# Patient Record
Sex: FEMALE | Race: WHITE | NOT HISPANIC OR LATINO | Employment: FULL TIME | RURAL
[De-identification: names, ages, dates, MRNs, and addresses within clinical notes are randomized per-mention and may not be internally consistent; named-entity substitution may affect disease eponyms.]

---

## 2020-01-31 ENCOUNTER — OFFICE VISIT (OUTPATIENT)
Dept: FAMILY MEDICINE CLINIC | Facility: CLINIC | Age: 60
End: 2020-01-31

## 2020-01-31 VITALS
WEIGHT: 189.6 LBS | HEART RATE: 89 BPM | OXYGEN SATURATION: 99 % | TEMPERATURE: 96.3 F | RESPIRATION RATE: 18 BRPM | SYSTOLIC BLOOD PRESSURE: 136 MMHG | BODY MASS INDEX: 42.65 KG/M2 | HEIGHT: 56 IN | DIASTOLIC BLOOD PRESSURE: 80 MMHG

## 2020-01-31 DIAGNOSIS — Z87.891 HISTORY OF TOBACCO USE: ICD-10-CM

## 2020-01-31 DIAGNOSIS — E66.3 OVERWEIGHT: ICD-10-CM

## 2020-01-31 DIAGNOSIS — G44.89 OTHER HEADACHE SYNDROME: ICD-10-CM

## 2020-01-31 DIAGNOSIS — R53.83 OTHER FATIGUE: Primary | ICD-10-CM

## 2020-01-31 PROCEDURE — 99202 OFFICE O/P NEW SF 15 MIN: CPT | Performed by: FAMILY MEDICINE

## 2020-01-31 RX ORDER — VITAMIN E 268 MG
400 CAPSULE ORAL DAILY
COMMUNITY
End: 2023-03-17

## 2020-01-31 RX ORDER — TOPIRAMATE 50 MG/1
TABLET, FILM COATED ORAL
Qty: 90 TABLET | Refills: 3 | Status: SHIPPED | OUTPATIENT
Start: 2020-01-31 | End: 2022-08-12

## 2020-01-31 RX ORDER — PHENOL 1.4 %
600 AEROSOL, SPRAY (ML) MUCOUS MEMBRANE
COMMUNITY
End: 2023-03-17

## 2020-01-31 RX ORDER — ESTRADIOL 0.04 MG/D
FILM, EXTENDED RELEASE TRANSDERMAL
COMMUNITY
Start: 2020-01-02 | End: 2022-08-12

## 2020-01-31 NOTE — PROGRESS NOTES
Subjective   Sariah Arnold is a 59 y.o. female.     Chief Complaint   Patient presents with   • Weight Gain   • Fatigue       Obesity   This is a new problem. The current episode started more than 1 month ago. The problem occurs constantly. The problem has been gradually worsening. Associated symptoms include fatigue. Pertinent negatives include no abdominal pain, chest pain, chills, diaphoresis or nausea.   Fatigue   This is a new problem. The current episode started more than 1 month ago. The problem occurs constantly. The problem has been gradually worsening. Associated symptoms include fatigue. Pertinent negatives include no abdominal pain, chest pain, chills, diaphoresis or nausea. She has tried rest and sleep for the symptoms. The treatment provided no relief.            I personally reviewed and updated the patient's allergies, medications, problem list, and past medical, surgical, social, and family history.     Family History   Problem Relation Age of Onset   • Hypertension Mother    • Cancer Sister         melanoma   • Diabetes Paternal Grandmother    • Diabetes Paternal Grandfather    • Cancer Half-Sister         thyroid cancer       Social History     Tobacco Use   • Smoking status: Former Smoker     Types: Cigarettes, Electronic Cigarette   • Smokeless tobacco: Never Used   Substance Use Topics   • Alcohol use: Yes     Frequency: Monthly or less   • Drug use: Never       Past Surgical History:   Procedure Laterality Date   • HYSTERECTOMY         Patient Active Problem List   Diagnosis   • History of tobacco use   • Other fatigue   • Overweight         Current Outpatient Medications:   •  calcium carbonate (OS-FLORENTIN) 600 MG tablet, Take 600 mg by mouth 3 (Three) Times a Day With Meals., Disp: , Rfl:   •  estradiol (VIVELLE-DOT) 0.0375 MG/24HR, , Disp: , Rfl:   •  vitamin E 400 UNIT capsule, Take 400 Units by mouth Daily., Disp: , Rfl:          Review of Systems   Constitutional: Positive for fatigue.  "Negative for chills and diaphoresis.   Eyes: Negative for visual disturbance.   Respiratory: Negative for shortness of breath.    Cardiovascular: Negative for chest pain and palpitations.   Gastrointestinal: Negative for abdominal pain and nausea.   Endocrine: Negative for polydipsia and polyphagia.   Musculoskeletal: Negative for neck stiffness.   Skin: Negative for color change and pallor.   Neurological: Negative for seizures and syncope.   Hematological: Negative for adenopathy.       Objective   /80   Pulse 89   Temp 96.3 °F (35.7 °C)   Resp 18   Ht 142.2 cm (56\")   Wt 86 kg (189 lb 9.6 oz)   SpO2 99%   Breastfeeding No   BMI 42.51 kg/m²   Wt Readings from Last 3 Encounters:   01/31/20 86 kg (189 lb 9.6 oz)   11/19/18 79.8 kg (176 lb)     Physical Exam   Constitutional: She is oriented to person, place, and time. She appears well-developed and well-nourished.   Cardiovascular: Normal rate, regular rhythm, S1 normal, S2 normal, normal heart sounds, intact distal pulses and normal pulses. Exam reveals no gallop and no friction rub.   No murmur heard.  Pulmonary/Chest: Effort normal and breath sounds normal. No accessory muscle usage or stridor. She has no decreased breath sounds. She has no wheezes. She has no rhonchi. She has no rales.   Abdominal: Soft. Normal appearance, normal aorta and bowel sounds are normal. She exhibits no distension, no pulsatile midline mass and no mass. There is no hepatosplenomegaly. There is no tenderness. There is no rigidity, no rebound, no guarding, no CVA tenderness and negative Balderas's sign. No hernia.   Neurological: She is alert and oriented to person, place, and time. Coordination and gait normal.   Skin: Skin is warm and dry. Turgor is normal. She is not diaphoretic. No pallor.         Assessment/Plan       Fatigue.  Has had blood work, replacing vitamin D, will get records.  Start regular exercise program.  Weight gain.  Following quitting smoking.  Discussed " diet, exercise, lifestyle modification.  History of intolerance to Wellbutrin, start Topamax.  Follow-up recheck.  Shift work disorder.  Has done well on Provigil, consider restart.  Screening for hyperlipidemia.  Has had blood work, will get records, follow-up visit scheduled.  Vis followed by OB/GYN Dr. Shook      Problem List Items Addressed This Visit        Other    History of tobacco use    Other fatigue - Primary    Overweight              Expected course, medications, and adverse effects discussed.  Call or return if worsening or persistent symptoms.

## 2020-05-01 ENCOUNTER — TELEPHONE (OUTPATIENT)
Dept: FAMILY MEDICINE CLINIC | Facility: CLINIC | Age: 60
End: 2020-05-01

## 2020-05-01 ENCOUNTER — TELEMEDICINE (OUTPATIENT)
Dept: FAMILY MEDICINE CLINIC | Facility: CLINIC | Age: 60
End: 2020-05-01

## 2020-05-01 VITALS — WEIGHT: 182.8 LBS | BODY MASS INDEX: 40.98 KG/M2

## 2020-05-01 DIAGNOSIS — E66.3 OVERWEIGHT: ICD-10-CM

## 2020-05-01 DIAGNOSIS — R53.83 OTHER FATIGUE: Primary | ICD-10-CM

## 2020-05-01 DIAGNOSIS — Z87.891 HISTORY OF TOBACCO USE: ICD-10-CM

## 2020-05-01 PROCEDURE — 99213 OFFICE O/P EST LOW 20 MIN: CPT | Performed by: FAMILY MEDICINE

## 2020-05-01 NOTE — PROGRESS NOTES
Subjective   Sariah Arnold is a 59 y.o. female.     No chief complaint on file.      Obesity   This is a new problem. The current episode started more than 1 month ago. The problem occurs constantly. The problem has been gradually worsening. Associated symptoms include fatigue. Pertinent negatives include no abdominal pain, chest pain, chills, diaphoresis or nausea.   Fatigue   This is a new problem. The current episode started more than 1 month ago. The problem occurs constantly. The problem has been gradually worsening. Associated symptoms include fatigue. Pertinent negatives include no abdominal pain, chest pain, chills, diaphoresis or nausea. She has tried rest and sleep for the symptoms. The treatment provided no relief.            I personally reviewed and updated the patient's allergies, medications, problem list, and past medical, surgical, social, and family history.     Family History   Problem Relation Age of Onset   • Hypertension Mother    • Cancer Sister         melanoma   • Diabetes Paternal Grandmother    • Diabetes Paternal Grandfather    • Cancer Half-Sister         thyroid cancer       Social History     Tobacco Use   • Smoking status: Former Smoker     Types: Cigarettes, Electronic Cigarette   • Smokeless tobacco: Never Used   Substance Use Topics   • Alcohol use: Yes     Frequency: Monthly or less   • Drug use: Never       Past Surgical History:   Procedure Laterality Date   • HYSTERECTOMY         Patient Active Problem List   Diagnosis   • History of tobacco use   • Other fatigue   • Overweight   • Other headache syndrome         Current Outpatient Medications:   •  calcium carbonate (OS-FLORENTIN) 600 MG tablet, Take 600 mg by mouth 3 (Three) Times a Day With Meals., Disp: , Rfl:   •  estradiol (VIVELLE-DOT) 0.0375 MG/24HR, , Disp: , Rfl:   •  topiramate (TOPAMAX) 50 MG tablet, Take 1 tablet nightly, Disp: 90 tablet, Rfl: 3  •  vitamin E 400 UNIT capsule, Take 400 Units by mouth Daily., Disp: ,  Rfl:          Review of Systems   Constitutional: Positive for fatigue. Negative for chills and diaphoresis.   Eyes: Negative for visual disturbance.   Respiratory: Negative for shortness of breath.    Cardiovascular: Negative for chest pain and palpitations.   Gastrointestinal: Negative for abdominal pain and nausea.   Endocrine: Negative for polydipsia and polyphagia.   Musculoskeletal: Negative for neck stiffness.   Skin: Negative for color change and pallor.   Neurological: Negative for seizures and syncope.   Hematological: Negative for adenopathy.   Psychiatric/Behavioral: Negative for hallucinations and suicidal ideas.       I have reviewed and confirmed the accuracy of the ROS as documented by the MA/LPN/RN Ever Champagne MD      Objective   Wt 82.9 kg (182 lb 12.8 oz)   BMI 40.98 kg/m²   Wt Readings from Last 3 Encounters:   05/01/20 82.9 kg (182 lb 12.8 oz)   01/31/20 86 kg (189 lb 9.6 oz)   11/19/18 79.8 kg (176 lb)     Physical Exam   Constitutional: She appears well-developed and well-nourished.       Sariah Arnold consented to undergo a video visit today.  This format was necessitated by the current covid-19 virus pandemic.    Assessment/Plan     Fatigue.  Replacing vitamin D, recheck levels.  Start regular exercise program.  Weight gain.  Following quitting smoking.  Discussed diet, exercise, lifestyle modification.  History of intolerance to Wellbutrin, not tolerating Topamax at 50 mg, cut in half.  Follow-up recheck.  Shift work disorder.  Has done well on Provigil, consider restart.  Screening for hyperlipidemia.    Check fasting blood work.  Followed by OB/GYN Dr. Shook.  Follow-up visit for comprehensive physical exam screening test scheduled.      Problem List Items Addressed This Visit        Unprioritized    History of tobacco use    Other fatigue - Primary    Overweight              Expected course, medications, and adverse effects discussed.  Call or return if worsening or persistent  symptoms.

## 2020-05-01 NOTE — TELEPHONE ENCOUNTER
Get her set up for a fasting for panel, free T4, B12, folate and vitamin D sometime in the next 1 to 2 weeks, schedule a physical/follow-up visit in 3 to 4 months, thanks

## 2020-05-26 ENCOUNTER — TELEPHONE (OUTPATIENT)
Dept: FAMILY MEDICINE CLINIC | Facility: CLINIC | Age: 60
End: 2020-05-26

## 2020-05-26 NOTE — TELEPHONE ENCOUNTER
----- Message from Ever Champagne MD sent at 5/18/2020 11:52 AM EDT -----  Let her know her blood work overall looks good, vitamin levels are normal, blood count looks good, cholesterol, blood sugar normal, her thyroid is just mildly low, not low enough to cause her any symptoms, this is likely to resolve on its own, will recheck this in 6 months, thanks

## 2020-06-01 ENCOUNTER — TELEPHONE (OUTPATIENT)
Dept: FAMILY MEDICINE CLINIC | Facility: CLINIC | Age: 60
End: 2020-06-01

## 2020-06-05 NOTE — TELEPHONE ENCOUNTER
We tried to call her several weeks ago and can get a hold of her, okay to use the same message that is in there, thanks

## 2020-08-14 ENCOUNTER — OFFICE VISIT (OUTPATIENT)
Dept: FAMILY MEDICINE CLINIC | Facility: CLINIC | Age: 60
End: 2020-08-14

## 2020-08-14 VITALS
HEART RATE: 96 BPM | WEIGHT: 187.8 LBS | TEMPERATURE: 98.4 F | RESPIRATION RATE: 16 BRPM | SYSTOLIC BLOOD PRESSURE: 128 MMHG | HEIGHT: 66 IN | DIASTOLIC BLOOD PRESSURE: 78 MMHG | OXYGEN SATURATION: 98 % | BODY MASS INDEX: 30.18 KG/M2

## 2020-08-14 DIAGNOSIS — R07.9 CHEST PAIN, UNSPECIFIED TYPE: ICD-10-CM

## 2020-08-14 DIAGNOSIS — Z12.39 ENCOUNTER FOR SCREENING FOR MALIGNANT NEOPLASM OF BREAST: ICD-10-CM

## 2020-08-14 DIAGNOSIS — G47.26 SHIFT WORK SLEEP DISORDER: ICD-10-CM

## 2020-08-14 DIAGNOSIS — Z12.4 SCREENING FOR MALIGNANT NEOPLASM OF CERVIX: ICD-10-CM

## 2020-08-14 DIAGNOSIS — Z87.891 HISTORY OF TOBACCO USE: ICD-10-CM

## 2020-08-14 DIAGNOSIS — E03.9 ACQUIRED HYPOTHYROIDISM: ICD-10-CM

## 2020-08-14 DIAGNOSIS — E66.3 OVERWEIGHT: ICD-10-CM

## 2020-08-14 DIAGNOSIS — Z12.11 SCREENING FOR MALIGNANT NEOPLASM OF COLON: ICD-10-CM

## 2020-08-14 DIAGNOSIS — Z00.01 ANNUAL VISIT FOR GENERAL ADULT MEDICAL EXAMINATION WITH ABNORMAL FINDINGS: Primary | ICD-10-CM

## 2020-08-14 PROBLEM — M19.90 ARTHRITIS: Status: ACTIVE | Noted: 2018-03-28

## 2020-08-14 LAB
BILIRUB BLD-MCNC: NEGATIVE MG/DL
CLARITY, POC: CLEAR
COLOR UR: YELLOW
GLUCOSE UR STRIP-MCNC: NEGATIVE MG/DL
KETONES UR QL: NEGATIVE
LEUKOCYTE EST, POC: NEGATIVE
NITRITE UR-MCNC: NEGATIVE MG/ML
PH UR: 5 [PH] (ref 5–8)
PROT UR STRIP-MCNC: NEGATIVE MG/DL
RBC # UR STRIP: NEGATIVE /UL
SP GR UR: 1.02 (ref 1–1.03)
UROBILINOGEN UR QL: NORMAL

## 2020-08-14 PROCEDURE — 81003 URINALYSIS AUTO W/O SCOPE: CPT | Performed by: FAMILY MEDICINE

## 2020-08-14 PROCEDURE — 99213 OFFICE O/P EST LOW 20 MIN: CPT | Performed by: FAMILY MEDICINE

## 2020-08-14 PROCEDURE — 99396 PREV VISIT EST AGE 40-64: CPT | Performed by: FAMILY MEDICINE

## 2020-08-14 RX ORDER — VALACYCLOVIR HYDROCHLORIDE 1 G/1
TABLET, FILM COATED ORAL
COMMUNITY

## 2020-08-14 RX ORDER — MODAFINIL 100 MG/1
100 TABLET ORAL DAILY
Qty: 30 TABLET | Refills: 2 | Status: SHIPPED | OUTPATIENT
Start: 2020-08-14 | End: 2020-11-13 | Stop reason: SDUPTHER

## 2020-08-14 RX ORDER — POLYETHYLENE GLYCOL 3350, SODIUM CHLORIDE, SODIUM BICARBONATE, POTASSIUM CHLORIDE 420; 11.2; 5.72; 1.48 G/4L; G/4L; G/4L; G/4L
POWDER, FOR SOLUTION ORAL
COMMUNITY
End: 2021-12-03

## 2020-08-14 NOTE — PROGRESS NOTES
Subjective   Sariah Arnold is a 59 y.o. female.     Chief Complaint   Patient presents with   • Annual Exam       The patient is here: to discuss health maintenance and disease prevention.  Last comprehensive physical was on 10/1/2013.  Patient's previous physician was .  Overall has: moderate activity with work/home activities, good appetite, feels well with minor complaints, decreased energy level and is sleeping poorly. Nutrition: balanced diet. Last tetanus shot was 10/22/2009. Patient is doing routine self breast exams: monthly Patient's last PAP Smear was: 3/2020. Patient's last DEXA Scan was: 4/24/2019. The Patient's last Mammogram was: 10/2019.    History of Present Illness     Recent Hospitalizations:  No hospitalization(s) within the last year..  ccc      I personally reviewed and updated the patient's allergies, medications, problem list, and past medical, surgical, social, and family history.     Family History   Problem Relation Age of Onset   • Hypertension Mother    • Cancer Sister         melanoma   • Diabetes Paternal Grandmother    • Diabetes Paternal Grandfather    • Cancer Half-Sister         thyroid cancer       Social History     Tobacco Use   • Smoking status: Former Smoker     Packs/day: 1.00     Years: 35.00     Pack years: 35.00     Types: Cigarettes, Electronic Cigarette   • Smokeless tobacco: Never Used   Substance Use Topics   • Alcohol use: Yes     Frequency: Monthly or less   • Drug use: Never       Past Surgical History:   Procedure Laterality Date   • HYSTERECTOMY         Patient Active Problem List   Diagnosis   • History of tobacco use   • Other fatigue   • Overweight   • Other headache syndrome   • Arthritis   • Annual visit for general adult medical examination with abnormal findings   • Screening for malignant neoplasm of colon   • Screening for malignant neoplasm of cervix   • Encounter for screening for malignant neoplasm of breast   • Shift work sleep disorder  "        Current Outpatient Medications:   •  calcium carbonate (OS-FLORENTIN) 600 MG tablet, Take 600 mg by mouth 3 (Three) Times a Day With Meals., Disp: , Rfl:   •  estradiol (VIVELLE-DOT) 0.0375 MG/24HR, , Disp: , Rfl:   •  vitamin E 400 UNIT capsule, Take 400 Units by mouth Daily., Disp: , Rfl:   •  esterified estrogens (Menest) 0.625 MG tablet, Menest 0.625 mg tablet, Disp: , Rfl:   •  modafinil (Provigil) 100 MG tablet, Take 1 tablet by mouth Daily., Disp: 30 tablet, Rfl: 2  •  polyethylene glycol-electrolytes (NULYTELY) 420 g solution, peg-electrolyte solution 420 gram oral solution, Disp: , Rfl:   •  topiramate (TOPAMAX) 50 MG tablet, Take 1 tablet nightly, Disp: 90 tablet, Rfl: 3  •  valACYclovir (VALTREX) 1000 MG tablet, valacyclovir 1 gram tablet, Disp: , Rfl:          Review of Systems   Constitutional: Negative for chills and diaphoresis.   HENT: Negative for trouble swallowing and voice change.    Eyes: Negative for visual disturbance.   Respiratory: Negative for shortness of breath.    Cardiovascular: Negative for chest pain and palpitations.   Gastrointestinal: Negative for abdominal pain and nausea.   Endocrine: Negative for polydipsia and polyphagia.   Genitourinary: Negative for hematuria.   Musculoskeletal: Negative for neck stiffness.   Skin: Negative for color change and pallor.   Allergic/Immunologic: Negative for immunocompromised state.   Neurological: Negative for seizures and syncope.   Hematological: Negative for adenopathy.   Psychiatric/Behavioral: Negative for sleep disturbance and suicidal ideas.       I have reviewed and confirmed the accuracy of the ROS as documented by the MA/LPN/RN Ever Champagne MD      Objective   /78   Pulse 96   Temp 98.4 °F (36.9 °C)   Resp 16   Ht 167.6 cm (66\")   Wt 85.2 kg (187 lb 12.8 oz)   SpO2 98%   BMI 30.31 kg/m²   BP Readings from Last 3 Encounters:   08/14/20 128/78   01/31/20 136/80   11/19/18 118/82     Wt Readings from Last 3 Encounters: "   08/14/20 85.2 kg (187 lb 12.8 oz)   05/01/20 82.9 kg (182 lb 12.8 oz)   01/31/20 86 kg (189 lb 9.6 oz)     Physical Exam   Constitutional: She is oriented to person, place, and time. She appears well-developed and well-nourished.   HENT:   Head: Normocephalic.   Right Ear: Tympanic membrane, external ear and ear canal normal.   Left Ear: Tympanic membrane, external ear and ear canal normal.   Nose: Nose normal.   Eyes: Pupils are equal, round, and reactive to light. Conjunctivae, EOM and lids are normal.   Neck: No JVD present. Carotid bruit is not present. No tracheal deviation present. No thyromegaly present.   Cardiovascular: Normal rate, regular rhythm, normal heart sounds and intact distal pulses. Exam reveals no gallop and no friction rub.   No murmur heard.  Pulmonary/Chest: Effort normal and breath sounds normal. No stridor. She has no decreased breath sounds. She has no wheezes. She has no rales.   Abdominal: Soft. Bowel sounds are normal. She exhibits no distension and no mass. There is no tenderness. There is no rebound and no guarding. No hernia.   Lymphadenopathy:        Head (right side): No submental, no submandibular, no tonsillar, no preauricular, no posterior auricular and no occipital adenopathy present.        Head (left side): No submental, no submandibular, no tonsillar, no preauricular, no posterior auricular and no occipital adenopathy present.     She has no cervical adenopathy.   Neurological: She is alert and oriented to person, place, and time. She has normal strength and normal reflexes. No cranial nerve deficit or sensory deficit. Coordination and gait normal.   Skin: Skin is warm and dry. Turgor is normal. She is not diaphoretic. No pallor.       Recent Lab Results:    Lab Results   Component Value Date    GLU 96 05/15/2020        Lab Results   Component Value Date    TRIG 119 05/15/2020    HDL 54 05/15/2020    VLDL 24 05/15/2020     LDL Cholesterol    Date Value Ref Range Status    05/15/2020 93 0 - 99 mg/dL Final     No results found for: PSA  Lab Results   Component Value Date    WBC 8.2 05/15/2020    HGB 13.3 05/15/2020    HCT 40.3 05/15/2020    MCV 89 05/15/2020     05/15/2020     Lab Results   Component Value Date    TSH 3.400 08/14/2020     Lab Results   Component Value Date    BUN 20 05/15/2020    CREATININE 1.00 05/15/2020    EGFRIFNONA 63 05/15/2020    EGFRIFAFRI 72 05/15/2020    BCR 20 05/15/2020    K 4.1 05/15/2020    CO2 25 05/15/2020    CALCIUM 9.6 05/15/2020    PROTENTOTREF 7.1 05/15/2020    ALBUMIN 4.6 05/15/2020    LABIL2 1.8 05/15/2020    AST 20 05/15/2020    ALT 20 05/15/2020         Age-appropriate Screening Schedule:  Refer to the list below for future screening recommendations based on patient's age, sex and/or medical conditions. Orders for these recommended tests are listed in the plan section. The patient has been provided with a written plan.    Health Maintenance   Topic Date Due   • ZOSTER VACCINE (1 of 2) 11/13/2012   • TDAP/TD VACCINES (2 - Td) 10/22/2019   • PAP SMEAR  01/09/2020   • COLONOSCOPY  01/09/2020   • INFLUENZA VACCINE  08/01/2020   • MAMMOGRAM  09/05/2020           Assessment/Plan      Medications        Problem List         LOS    Physical.  Doing well, vaccines current.  Coated baby aspirin daily.  Discussed calcium and vitamin D.  Discussed health maintenance, screening test, lifestyle modification.  Followed by OB/GYN Dr. Shook mammogram current.  Tobacco use.  Significant history, has quit.  Check CT scan lung cancer screening.  Check EKG.   Fatigue.  Replacing vitamin D, recheck levels.  Start regular exercise program.  Weight gain.  Following quitting smoking.  Discussed diet, exercise, lifestyle modification.  History of intolerance to Wellbutrin, not tolerating Topamax.  Follow-up recheck.  Shift work disorder.  Has done well on Provigil, restart.  Add melatonin  Colon screening.  Colonoscopy 2017 with diverticulosis only.    Problem  List Items Addressed This Visit        Unprioritized    History of tobacco use    Relevant Orders    CT Chest Low Dose Wo    Overweight    Annual visit for general adult medical examination with abnormal findings - Primary    Relevant Orders    POC Urinalysis Dipstick, Multipro (Completed)    Screening for malignant neoplasm of colon    Screening for malignant neoplasm of cervix    Encounter for screening for malignant neoplasm of breast    Shift work sleep disorder    Relevant Medications    modafinil (Provigil) 100 MG tablet      Other Visit Diagnoses     Chest pain, unspecified type        Relevant Orders    Rhythm Ecg, Report    Acquired hypothyroidism        Relevant Orders    T4, Free (Completed)    T3, Free (Completed)    TSH (Completed)              Expected course, medications, and adverse effects discussed.  Call or return if worsening or persistent symptoms.

## 2020-08-15 LAB
T3FREE SERPL-MCNC: 4 PG/ML (ref 2–4.4)
T4 FREE SERPL-MCNC: 1.27 NG/DL (ref 0.82–1.77)
TSH SERPL DL<=0.005 MIU/L-ACNC: 3.4 UIU/ML (ref 0.45–4.5)

## 2020-08-17 ENCOUNTER — TELEPHONE (OUTPATIENT)
Dept: FAMILY MEDICINE CLINIC | Facility: CLINIC | Age: 60
End: 2020-08-17

## 2020-08-17 NOTE — TELEPHONE ENCOUNTER
----- Message from Ever Champagne MD sent at 8/15/2020  9:22 AM EDT -----  Let her know her thyroid labs look good, everything has returned to normal, want her to try the Provigil as planned, thanks

## 2020-08-20 PROBLEM — G47.26 SHIFT WORK SLEEP DISORDER: Status: ACTIVE | Noted: 2020-08-20

## 2020-09-04 ENCOUNTER — HOSPITAL ENCOUNTER (OUTPATIENT)
Dept: CT IMAGING | Facility: HOSPITAL | Age: 60
Discharge: HOME | End: 2020-09-04
Admitting: FAMILY MEDICINE

## 2020-09-04 DIAGNOSIS — Z87.891 HISTORY OF TOBACCO USE: ICD-10-CM

## 2020-09-04 PROCEDURE — G0297 LDCT FOR LUNG CA SCREEN: HCPCS

## 2020-09-09 ENCOUNTER — TELEPHONE (OUTPATIENT)
Dept: FAMILY MEDICINE CLINIC | Facility: CLINIC | Age: 60
End: 2020-09-09

## 2020-09-10 NOTE — TELEPHONE ENCOUNTER
Let her know CT scan looks good, no suspicious nodules were seen, would consider repeating a CT scan in a year, thanks

## 2020-09-14 ENCOUNTER — TELEPHONE (OUTPATIENT)
Dept: FAMILY MEDICINE CLINIC | Facility: CLINIC | Age: 60
End: 2020-09-14

## 2020-09-14 NOTE — TELEPHONE ENCOUNTER
----- Message from Ever Champagne MD sent at 9/9/2020  4:02 PM EDT -----  Let her know her CT scan overall looks good, she has some small nodules but they have calcium buildup in them and appear benign, can symptoms can see some effects of the smoking affecting her lungs, thanks

## 2020-10-18 ENCOUNTER — HOSPITAL ENCOUNTER (EMERGENCY)
Facility: HOSPITAL | Age: 60
Discharge: HOME | End: 2020-10-18
Attending: EMERGENCY MEDICINE | Admitting: EMERGENCY MEDICINE

## 2020-10-18 VITALS
WEIGHT: 177.91 LBS | OXYGEN SATURATION: 97 % | HEIGHT: 67 IN | DIASTOLIC BLOOD PRESSURE: 76 MMHG | RESPIRATION RATE: 16 BRPM | SYSTOLIC BLOOD PRESSURE: 117 MMHG | BODY MASS INDEX: 27.92 KG/M2 | HEART RATE: 84 BPM | TEMPERATURE: 98.1 F

## 2020-10-18 DIAGNOSIS — R11.15 PERSISTENT VOMITING: ICD-10-CM

## 2020-10-18 DIAGNOSIS — U07.1 COVID-19 VIRUS INFECTION: Primary | ICD-10-CM

## 2020-10-18 LAB
ANION GAP SERPL CALCULATED.3IONS-SCNC: 14 MMOL/L (ref 5–15)
BASOPHILS # BLD AUTO: 0 10*3/MM3 (ref 0–0.2)
BASOPHILS NFR BLD AUTO: 0.5 % (ref 0–1.5)
BUN SERPL-MCNC: 15 MG/DL (ref 6–20)
BUN/CREAT SERPL: 18.3 (ref 7–25)
CALCIUM SPEC-SCNC: 9.8 MG/DL (ref 8.6–10.5)
CHLORIDE SERPL-SCNC: 99 MMOL/L (ref 98–107)
CO2 SERPL-SCNC: 27 MMOL/L (ref 22–29)
CREAT SERPL-MCNC: 0.82 MG/DL (ref 0.57–1)
DEPRECATED RDW RBC AUTO: 40.7 FL (ref 37–54)
EOSINOPHIL # BLD AUTO: 0 10*3/MM3 (ref 0–0.4)
EOSINOPHIL NFR BLD AUTO: 0.1 % (ref 0.3–6.2)
ERYTHROCYTE [DISTWIDTH] IN BLOOD BY AUTOMATED COUNT: 13.3 % (ref 12.3–15.4)
GFR SERPL CREATININE-BSD FRML MDRD: 72 ML/MIN/1.73
GLUCOSE SERPL-MCNC: 112 MG/DL (ref 65–99)
HCT VFR BLD AUTO: 43.5 % (ref 34–46.6)
HGB BLD-MCNC: 14.5 G/DL (ref 12–15.9)
LYMPHOCYTES # BLD AUTO: 1.2 10*3/MM3 (ref 0.7–3.1)
LYMPHOCYTES NFR BLD AUTO: 25.7 % (ref 19.6–45.3)
MCH RBC QN AUTO: 28.8 PG (ref 26.6–33)
MCHC RBC AUTO-ENTMCNC: 33.3 G/DL (ref 31.5–35.7)
MCV RBC AUTO: 86.5 FL (ref 79–97)
MONOCYTES # BLD AUTO: 0.4 10*3/MM3 (ref 0.1–0.9)
MONOCYTES NFR BLD AUTO: 9.7 % (ref 5–12)
NEUTROPHILS NFR BLD AUTO: 2.9 10*3/MM3 (ref 1.7–7)
NEUTROPHILS NFR BLD AUTO: 64 % (ref 42.7–76)
NRBC BLD AUTO-RTO: 0.2 /100 WBC (ref 0–0.2)
PLATELET # BLD AUTO: 197 10*3/MM3 (ref 140–450)
PMV BLD AUTO: 8 FL (ref 6–12)
POTASSIUM SERPL-SCNC: 4 MMOL/L (ref 3.5–5.2)
RBC # BLD AUTO: 5.02 10*6/MM3 (ref 3.77–5.28)
SODIUM SERPL-SCNC: 140 MMOL/L (ref 136–145)
WBC # BLD AUTO: 4.6 10*3/MM3 (ref 3.4–10.8)

## 2020-10-18 PROCEDURE — 80048 BASIC METABOLIC PNL TOTAL CA: CPT | Performed by: EMERGENCY MEDICINE

## 2020-10-18 PROCEDURE — 99283 EMERGENCY DEPT VISIT LOW MDM: CPT

## 2020-10-18 PROCEDURE — 85025 COMPLETE CBC W/AUTO DIFF WBC: CPT | Performed by: EMERGENCY MEDICINE

## 2020-10-18 PROCEDURE — 25010000002 ONDANSETRON PER 1 MG: Performed by: EMERGENCY MEDICINE

## 2020-10-18 PROCEDURE — 96374 THER/PROPH/DIAG INJ IV PUSH: CPT

## 2020-10-18 RX ORDER — ONDANSETRON 4 MG/1
4 TABLET, ORALLY DISINTEGRATING ORAL EVERY 8 HOURS PRN
Qty: 12 TABLET | Refills: 0 | Status: SHIPPED | OUTPATIENT
Start: 2020-10-18 | End: 2021-12-03

## 2020-10-18 RX ORDER — SODIUM CHLORIDE 0.9 % (FLUSH) 0.9 %
10 SYRINGE (ML) INJECTION AS NEEDED
Status: DISCONTINUED | OUTPATIENT
Start: 2020-10-18 | End: 2020-10-18 | Stop reason: HOSPADM

## 2020-10-18 RX ORDER — ONDANSETRON 2 MG/ML
4 INJECTION INTRAMUSCULAR; INTRAVENOUS ONCE
Status: COMPLETED | OUTPATIENT
Start: 2020-10-18 | End: 2020-10-18

## 2020-10-18 RX ADMIN — ONDANSETRON 4 MG: 2 INJECTION INTRAMUSCULAR; INTRAVENOUS at 13:10

## 2020-10-18 RX ADMIN — SODIUM CHLORIDE 500 ML: 9 INJECTION, SOLUTION INTRAVENOUS at 13:10

## 2020-10-18 NOTE — ED NOTES
Pt started feeling unwell on 10/9, tested 10/10, received positive on 10/13, feeling weak, fatigue, n/v/d     Kaia Addison, RN  10/18/20 9680

## 2020-10-18 NOTE — ED NOTES
Pt c/o fatigue, n/v/d, h/a x3 days; states has felt poorly since 10/3/2020 with +covid on 10/13/2020.     Guillermina Glez RN  10/18/20 [id number]

## 2020-10-18 NOTE — ED PROVIDER NOTES
Subjective   Patient is a 59-year-old female complaining of generalized weakness and achiness with vomiting.  She states he was diagnosed with Covid approximately 9 days ago.  She denies change in her cough fever diarrhea or other complaint          Review of Systems  Negative for headache earache sore throat fever chest pain diarrhea or other complaint  Past Medical History:   Diagnosis Date   • Diverticulitis        Allergies   Allergen Reactions   • Cefaclor Rash     CECLOR      • Bupropion Rash     WELLBUTRIN        Past Surgical History:   Procedure Laterality Date   • HYSTERECTOMY         Family History   Problem Relation Age of Onset   • Hypertension Mother    • Cancer Sister         melanoma   • Diabetes Paternal Grandmother    • Diabetes Paternal Grandfather    • Cancer Half-Sister         thyroid cancer       Social History     Socioeconomic History   • Marital status:      Spouse name: Not on file   • Number of children: Not on file   • Years of education: Not on file   • Highest education level: Not on file   Tobacco Use   • Smoking status: Former Smoker     Packs/day: 1.00     Years: 35.00     Pack years: 35.00     Types: Cigarettes, Electronic Cigarette   • Smokeless tobacco: Never Used   Substance and Sexual Activity   • Alcohol use: Yes     Frequency: Monthly or less   • Drug use: Never   • Sexual activity: Defer           Objective   Physical Exam  HEENT exam shows TMs to be clear but oropharynx comers.  Neck has no adenopathy.  Lungs are clear.  Heart has regular rate rhythm.  Abdomen is soft nontender.  Patient has normal bowel sounds without rebound or guarding.  Back has no CVA tenderness.  Extremities arm is unremarkable.  Procedures           ED Course      Results for orders placed or performed during the hospital encounter of 10/18/20   Basic Metabolic Panel    Specimen: Blood   Result Value Ref Range    Glucose 112 (H) 65 - 99 mg/dL    BUN 15 6 - 20 mg/dL    Creatinine 0.82 0.57 -  1.00 mg/dL    Sodium 140 136 - 145 mmol/L    Potassium 4.0 3.5 - 5.2 mmol/L    Chloride 99 98 - 107 mmol/L    CO2 27.0 22.0 - 29.0 mmol/L    Calcium 9.8 8.6 - 10.5 mg/dL    eGFR Non African Amer 72 >60 mL/min/1.73    BUN/Creatinine Ratio 18.3 7.0 - 25.0    Anion Gap 14.0 5.0 - 15.0 mmol/L   CBC Auto Differential    Specimen: Blood   Result Value Ref Range    WBC 4.60 3.40 - 10.80 10*3/mm3    RBC 5.02 3.77 - 5.28 10*6/mm3    Hemoglobin 14.5 12.0 - 15.9 g/dL    Hematocrit 43.5 34.0 - 46.6 %    MCV 86.5 79.0 - 97.0 fL    MCH 28.8 26.6 - 33.0 pg    MCHC 33.3 31.5 - 35.7 g/dL    RDW 13.3 12.3 - 15.4 %    RDW-SD 40.7 37.0 - 54.0 fl    MPV 8.0 6.0 - 12.0 fL    Platelets 197 140 - 450 10*3/mm3    Neutrophil % 64.0 42.7 - 76.0 %    Lymphocyte % 25.7 19.6 - 45.3 %    Monocyte % 9.7 5.0 - 12.0 %    Eosinophil % 0.1 (L) 0.3 - 6.2 %    Basophil % 0.5 0.0 - 1.5 %    Neutrophils, Absolute 2.90 1.70 - 7.00 10*3/mm3    Lymphocytes, Absolute 1.20 0.70 - 3.10 10*3/mm3    Monocytes, Absolute 0.40 0.10 - 0.90 10*3/mm3    Eosinophils, Absolute 0.00 0.00 - 0.40 10*3/mm3    Basophils, Absolute 0.00 0.00 - 0.20 10*3/mm3    nRBC 0.2 0.0 - 0.2 /100 WBC                                          MDM  Number of Diagnoses or Management Options  Diagnosis management comments: Patient has a benign physical exam.  She was given IV fluids and Zofran.  On reexam she feels improved.  She will be discharged with a prescription for Zofran.  She will see MD for recheck as needed.  No evidence acute metabolic abnormality or other infectious process       Amount and/or Complexity of Data Reviewed  Clinical lab tests: reviewed    Risk of Complications, Morbidity, and/or Mortality  Presenting problems: high  Diagnostic procedures: high  Management options: high    Patient Progress  Patient progress: stable      Final diagnoses:   COVID-19 virus infection   Persistent vomiting            Wolfgang Yu MD  10/18/20 [id number]

## 2020-11-13 ENCOUNTER — OFFICE VISIT (OUTPATIENT)
Dept: FAMILY MEDICINE CLINIC | Facility: CLINIC | Age: 60
End: 2020-11-13

## 2020-11-13 VITALS — WEIGHT: 180 LBS | HEIGHT: 67 IN | BODY MASS INDEX: 28.25 KG/M2

## 2020-11-13 DIAGNOSIS — G47.26 SHIFT WORK SLEEP DISORDER: Primary | ICD-10-CM

## 2020-11-13 DIAGNOSIS — E66.3 OVERWEIGHT WITH BODY MASS INDEX (BMI) OF 27 TO 27.9 IN ADULT: ICD-10-CM

## 2020-11-13 DIAGNOSIS — Z87.891 HISTORY OF TOBACCO USE: ICD-10-CM

## 2020-11-13 PROCEDURE — 99442 PR PHYS/QHP TELEPHONE EVALUATION 11-20 MIN: CPT | Performed by: FAMILY MEDICINE

## 2020-11-13 RX ORDER — MODAFINIL 100 MG/1
100 TABLET ORAL DAILY
Qty: 30 TABLET | Refills: 2 | Status: SHIPPED | OUTPATIENT
Start: 2020-11-13 | End: 2021-12-03 | Stop reason: SDUPTHER

## 2020-11-13 NOTE — PROGRESS NOTES
Subjective   Sariah Arnold is a 60 y.o. female.     Chief Complaint   Patient presents with   • Insomnia       Fatigue  This is a new problem. The current episode started more than 1 month ago. The problem occurs constantly. The problem has been gradually worsening. Pertinent negatives include no abdominal pain, chest pain, chills, congestion, diaphoresis, fever, nausea, sore throat or vomiting. She has tried rest and sleep for the symptoms. The treatment provided no relief.            I personally reviewed and updated the patient's allergies, medications, problem list, and past medical, surgical, social, and family history. I have reviewed and confirmed the accuracy of the History of Present Illness and Review of Symptoms as documented by the MA/LEIGHN/RN. Vale Petty MA    Family History   Problem Relation Age of Onset   • Hypertension Mother    • Cancer Sister         melanoma   • Diabetes Paternal Grandmother    • Diabetes Paternal Grandfather    • Cancer Half-Sister         thyroid cancer       Social History     Tobacco Use   • Smoking status: Former Smoker     Packs/day: 1.00     Years: 35.00     Pack years: 35.00     Types: Cigarettes, Electronic Cigarette   • Smokeless tobacco: Never Used   Substance Use Topics   • Alcohol use: Yes     Frequency: Monthly or less   • Drug use: Never       Past Surgical History:   Procedure Laterality Date   • HYSTERECTOMY         Patient Active Problem List   Diagnosis   • History of tobacco use   • Other fatigue   • Overweight with body mass index (BMI) of 27 to 27.9 in adult   • Other headache syndrome   • Arthritis   • Annual visit for general adult medical examination with abnormal findings   • Screening for malignant neoplasm of colon   • Screening for malignant neoplasm of cervix   • Encounter for screening for malignant neoplasm of breast   • Shift work sleep disorder         Current Outpatient Medications:   •  calcium carbonate (OS-FLORENTIN) 600 MG tablet, Take 600 mg  "by mouth 3 (Three) Times a Day With Meals., Disp: , Rfl:   •  esterified estrogens (Menest) 0.625 MG tablet, Menest 0.625 mg tablet, Disp: , Rfl:   •  estradiol (VIVELLE-DOT) 0.0375 MG/24HR, , Disp: , Rfl:   •  modafinil (Provigil) 100 MG tablet, Take 1 tablet by mouth Daily., Disp: 30 tablet, Rfl: 2  •  ondansetron ODT (ZOFRAN-ODT) 4 MG disintegrating tablet, Place 1 tablet on the tongue Every 8 (Eight) Hours As Needed for Vomiting., Disp: 12 tablet, Rfl: 0  •  polyethylene glycol-electrolytes (NULYTELY) 420 g solution, peg-electrolyte solution 420 gram oral solution, Disp: , Rfl:   •  topiramate (TOPAMAX) 50 MG tablet, Take 1 tablet nightly, Disp: 90 tablet, Rfl: 3  •  valACYclovir (VALTREX) 1000 MG tablet, valacyclovir 1 gram tablet, Disp: , Rfl:   •  vitamin E 400 UNIT capsule, Take 400 Units by mouth Daily., Disp: , Rfl:          Review of Systems   Constitutional: Negative for chills, diaphoresis and fever.   HENT: Negative for congestion and sore throat.    Eyes: Negative for visual disturbance.   Respiratory: Negative for shortness of breath.    Cardiovascular: Negative for chest pain and palpitations.   Gastrointestinal: Negative for abdominal pain, nausea and vomiting.   Endocrine: Negative for polydipsia and polyphagia.   Musculoskeletal: Negative for neck stiffness.   Skin: Negative for color change and pallor.   Neurological: Negative for seizures and syncope.   Hematological: Negative for adenopathy.   Psychiatric/Behavioral: Negative for hallucinations and suicidal ideas.       I have reviewed and confirmed the accuracy of the ROS as documented by the MA/LPN/RN Vale Petty MA      Objective   Ht 170.2 cm (67\")   Wt 81.6 kg (180 lb)   Breastfeeding No   BMI 28.19 kg/m²   BP Readings from Last 3 Encounters:   10/18/20 117/76   08/14/20 128/78   01/31/20 136/80     Wt Readings from Last 3 Encounters:   11/13/20 81.6 kg (180 lb)   10/18/20 80.7 kg (177 lb 14.6 oz)   08/14/20 85.2 kg (187 lb 12.8 " oz)     Physical Exam    Recent Lab Results:    No results found for: HGBA1C  Lab Results   Component Value Date    GLU 96 05/15/2020     Lab Results   Component Value Date    LDL 93 05/15/2020     No results found for: CHOL  Lab Results   Component Value Date    TRIG 119 05/15/2020     Lab Results   Component Value Date    HDL 54 05/15/2020     No results found for: PSA  Lab Results   Component Value Date    WBC 4.60 10/18/2020    HGB 14.5 10/18/2020    HCT 43.5 10/18/2020    MCV 86.5 10/18/2020     10/18/2020     Lab Results   Component Value Date    TSH 3.400 08/14/2020      Lab Results   Component Value Date    GLUCOSE 112 (H) 10/18/2020    BUN 15 10/18/2020    CREATININE 0.82 10/18/2020    EGFRIFNONA 72 10/18/2020    EGFRIFAFRI 72 05/15/2020    BCR 18.3 10/18/2020    K 4.0 10/18/2020    CO2 27.0 10/18/2020    CALCIUM 9.8 10/18/2020    PROTENTOTREF 7.1 05/15/2020    ALBUMIN 4.6 05/15/2020    LABIL2 1.8 05/15/2020    AST 20 05/15/2020    ALT 20 05/15/2020     No results found for: VOLODYMYR, RF, SEDRATE   No results found for: CRP   No results found for: IRON, TIBC, FERRITIN   Lab Results   Component Value Date    [id number] 610 05/15/2020        Sariah Arnold consented to undergo a telephone visit today.  This format was necessitated by the current covid-19 virus pandemic. 14 minutes was spent on the phone today with Sariah discussing her acute concerns of chronic medical problems.    Assessment/Plan      Medications        Problem List         LOS    Health maintenance.  Doing well, vaccines current.  Coated baby aspirin daily.  Discussed calcium and vitamin D.  Discussed health maintenance, screening test, lifestyle modification.  Followed by OB/GYN Dr. Shook mammogram current.  Tobacco use.  Significant history, has quit.  Check CT scan lung cancer screening.  Check EKG.   Fatigue.  Replacing vitamin D, recheck levels.  Start regular exercise program.  Weight gain.  Following quitting smoking.  Discussed  diet, exercise, lifestyle modification.  History of intolerance to Wellbutrin, not tolerating Topamax.  Follow-up recheck.  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  Add melatonin  Colon screening.  Colonoscopy 2017 with diverticulosis only.  COVID-19 viral infection.  October/20.  Doing well currently, resolved.  Call return if recurrent symptoms.        Problem List Items Addressed This Visit        Other    History of tobacco use    Overweight with body mass index (BMI) of 27 to 27.9 in adult    Shift work sleep disorder - Primary              Expected course, medications, and adverse effects discussed.  Call or return if worsening or persistent symptoms.

## 2021-06-02 DIAGNOSIS — G47.26 SHIFT WORK SLEEP DISORDER: ICD-10-CM

## 2021-06-03 RX ORDER — MODAFINIL 100 MG/1
100 TABLET ORAL DAILY
Qty: 30 TABLET | Refills: 2 | OUTPATIENT
Start: 2021-06-03

## 2021-06-03 NOTE — TELEPHONE ENCOUNTER
Caller: Sariah Arnold    Relationship: Self    Best call back number: [phone] (H)    Medication needed:   Requested Prescriptions     Pending Prescriptions Disp Refills   • modafinil (Provigil) 100 MG tablet 30 tablet 2     Sig: Take 1 tablet by mouth Daily.       When do you need the refill by: ASAP    What additional details did the patient provide when requesting the medication: PATIENT CALLED TO REQUEST A MEDICATION REFILL ON HER MEDICATION. PATIENT STATES THAT SHE IS COMPLETELY OUT OF MEDICATION FOR OVER A WEEK NOW.       Does the patient have less than a 3 day supply:  [] Yes  [] No    What is the patient's preferred pharmacy:    [facility]/pharmacy #[id number] - [place], [place] - [address] - [phone] [facility] [phone] FX    THANKS

## 2021-06-03 NOTE — TELEPHONE ENCOUNTER
Okay to send request for 3 days, get her scheduled back and/needs to be seen periodically for this 1, thanks

## 2021-06-04 DIAGNOSIS — G47.26 SHIFT WORK SLEEP DISORDER: ICD-10-CM

## 2021-06-04 RX ORDER — MODAFINIL 100 MG/1
100 TABLET ORAL DAILY
Qty: 15 TABLET | Refills: 0 | OUTPATIENT
Start: 2021-06-04

## 2021-06-07 NOTE — TELEPHONE ENCOUNTER
Called and left message. She did not answer. We can work her in a double book to be seen next week. If needed

## 2021-12-03 ENCOUNTER — OFFICE VISIT (OUTPATIENT)
Dept: FAMILY MEDICINE CLINIC | Facility: CLINIC | Age: 61
End: 2021-12-03

## 2021-12-03 VITALS
WEIGHT: 196.8 LBS | RESPIRATION RATE: 18 BRPM | BODY MASS INDEX: 31.63 KG/M2 | SYSTOLIC BLOOD PRESSURE: 122 MMHG | OXYGEN SATURATION: 98 % | TEMPERATURE: 97.8 F | DIASTOLIC BLOOD PRESSURE: 84 MMHG | HEART RATE: 47 BPM | HEIGHT: 66 IN

## 2021-12-03 DIAGNOSIS — Z12.11 SCREENING FOR MALIGNANT NEOPLASM OF COLON: ICD-10-CM

## 2021-12-03 DIAGNOSIS — M54.9 ACUTE BACK PAIN, UNSPECIFIED BACK LOCATION, UNSPECIFIED BACK PAIN LATERALITY: ICD-10-CM

## 2021-12-03 DIAGNOSIS — E66.3 OVERWEIGHT WITH BODY MASS INDEX (BMI) OF 27 TO 27.9 IN ADULT: ICD-10-CM

## 2021-12-03 DIAGNOSIS — E78.2 MIXED HYPERLIPIDEMIA: ICD-10-CM

## 2021-12-03 DIAGNOSIS — Z12.31 ENCOUNTER FOR SCREENING MAMMOGRAM FOR MALIGNANT NEOPLASM OF BREAST: ICD-10-CM

## 2021-12-03 DIAGNOSIS — Z12.4 SCREENING FOR MALIGNANT NEOPLASM OF CERVIX: ICD-10-CM

## 2021-12-03 DIAGNOSIS — Z87.891 HISTORY OF TOBACCO USE: ICD-10-CM

## 2021-12-03 DIAGNOSIS — Z00.01 ANNUAL VISIT FOR GENERAL ADULT MEDICAL EXAMINATION WITH ABNORMAL FINDINGS: Primary | ICD-10-CM

## 2021-12-03 DIAGNOSIS — G47.26 SHIFT WORK SLEEP DISORDER: ICD-10-CM

## 2021-12-03 PROCEDURE — 99396 PREV VISIT EST AGE 40-64: CPT | Performed by: FAMILY MEDICINE

## 2021-12-03 PROCEDURE — 99213 OFFICE O/P EST LOW 20 MIN: CPT | Performed by: FAMILY MEDICINE

## 2021-12-03 RX ORDER — MODAFINIL 100 MG/1
100 TABLET ORAL DAILY
Qty: 30 TABLET | Refills: 2 | Status: SHIPPED | OUTPATIENT
Start: 2021-12-03 | End: 2022-03-22 | Stop reason: SDUPTHER

## 2021-12-03 RX ORDER — IBUPROFEN 800 MG/1
800 TABLET ORAL EVERY 6 HOURS PRN
Qty: 90 TABLET | Refills: 3 | Status: SHIPPED | OUTPATIENT
Start: 2021-12-03 | End: 2022-03-02

## 2021-12-03 NOTE — PROGRESS NOTES
Subjective   Sariah Arnold is a 61 y.o. female.     Chief Complaint   Patient presents with   • Annual Exam   • Hyperlipidemia       The patient is here: for coordination of medical care to discuss health maintenance and disease prevention to follow up on multiple medical problems.  Last comprehensive physical was on 8/14/2020.  Previous physical was performed by  Ever Champagne MD  Overall has: moderate activity with work/home activities, good appetite, decreased energy level and is sleeping well. Nutrition: eating a variety of foods. Last tetanus shot was 10/22/2009.   Patient is doing routine self breast exams: monthly Patient's last mammo was in January 2021 at [facility] with ultrasound. Will get records. Patient is followed by Dr Shook for gyn.   Last Completed Colonoscopy          COLORECTAL CANCER SCREENING (COLONOSCOPY - Every 10 Years) Next due on 4/2/2028 04/02/2018  SCANNED - COLONOSCOPY                Hyperlipidemia  This is a new problem. The current episode started more than 1 month ago. Exacerbating diseases include obesity. There are no known factors aggravating her hyperlipidemia. Associated symptoms include leg pain. Pertinent negatives include no chest pain or shortness of breath. She is currently on no antihyperlipidemic treatment. There are no compliance problems.  Risk factors for coronary artery disease include obesity.        Recent Hospitalizations:  No hospitalization(s) within the last year..  ccc      I personally reviewed and updated the patient's allergies, medications, problem list, and past medical, surgical, social, and family history. I have reviewed and confirmed the accuracy of the HPI and ROS as documented by the MA/LPN/RN Ever Champagne MD    Family History   Problem Relation Age of Onset   • Hypertension Mother    • Melanoma Sister    • Thyroid cancer Sister    • Diabetes Paternal Grandmother    • Diabetes Paternal Grandfather    • Cancer Half-Sister         thyroid cancer        Social History     Tobacco Use   • Smoking status: Former Smoker     Packs/day: 1.50     Years: 35.00     Pack years: 52.50     Types: Cigarettes, Electronic Cigarette     Start date:      Quit date:      Years since quittin.9   • Smokeless tobacco: Never Used   Vaping Use   • Vaping Use: Former   Substance Use Topics   • Alcohol use: Yes   • Drug use: Never       Past Surgical History:   Procedure Laterality Date   • HYSTERECTOMY         Patient Active Problem List   Diagnosis   • History of tobacco use   • Other fatigue   • Overweight with body mass index (BMI) of 27 to 27.9 in adult   • Other headache syndrome   • Arthritis   • Annual visit for general adult medical examination with abnormal findings   • Screening for malignant neoplasm of colon   • Screening for malignant neoplasm of cervix   • Encounter for screening for malignant neoplasm of breast   • Shift work sleep disorder         Current Outpatient Medications:   •  calcium carbonate (OS-FLORENTIN) 600 MG tablet, Take 600 mg by mouth 3 (Three) Times a Day With Meals., Disp: , Rfl:   •  estradiol (VIVELLE-DOT) 0.0375 MG/24HR, , Disp: , Rfl:   •  valACYclovir (VALTREX) 1000 MG tablet, valacyclovir 1 gram tablet, Disp: , Rfl:   •  vitamin E 400 UNIT capsule, Take 400 Units by mouth Daily., Disp: , Rfl:   •  ibuprofen (ADVIL,MOTRIN) 800 MG tablet, Take 1 tablet by mouth Every 6 (Six) Hours As Needed for Mild Pain ., Disp: 90 tablet, Rfl: 3  •  modafinil (Provigil) 100 MG tablet, Take 1 tablet by mouth Daily., Disp: 30 tablet, Rfl: 2  •  topiramate (TOPAMAX) 50 MG tablet, Take 1 tablet nightly, Disp: 90 tablet, Rfl: 3    Review of Systems   Constitutional: Negative for chills and diaphoresis.   HENT: Negative for trouble swallowing and voice change.    Eyes: Negative for visual disturbance.   Respiratory: Negative for shortness of breath.    Cardiovascular: Negative for chest pain and palpitations.   Gastrointestinal: Negative for abdominal pain  "and nausea.   Endocrine: Negative for polydipsia and polyphagia.   Genitourinary: Negative for hematuria.   Musculoskeletal: Negative for neck stiffness.   Skin: Negative for color change and pallor.   Allergic/Immunologic: Negative for immunocompromised state.   Neurological: Negative for seizures and syncope.   Hematological: Negative for adenopathy.   Psychiatric/Behavioral: Negative for sleep disturbance and suicidal ideas.       I have reviewed and confirmed the accuracy of the ROS as documented by the MA/LPN/RN Ever Champagne MD      Objective   /84   Pulse (!) 47   Temp 97.8 °F (36.6 °C)   Resp 18   Ht 167.6 cm (66\")   Wt 89.3 kg (196 lb 12.8 oz)   SpO2 98%   Breastfeeding No   BMI 31.76 kg/m²   BP Readings from Last 3 Encounters:   12/03/21 122/84   10/18/20 117/76   08/14/20 128/78     Wt Readings from Last 3 Encounters:   12/03/21 89.3 kg (196 lb 12.8 oz)   11/13/20 81.6 kg (180 lb)   10/18/20 80.7 kg (177 lb 14.6 oz)     Physical Exam  Constitutional:       Appearance: She is well-developed. She is not diaphoretic.   HENT:      Head: Normocephalic.      Right Ear: Tympanic membrane, ear canal and external ear normal.      Left Ear: Tympanic membrane, ear canal and external ear normal.      Nose: Nose normal.   Eyes:      General: Lids are normal.      Conjunctiva/sclera: Conjunctivae normal.      Pupils: Pupils are equal, round, and reactive to light.   Neck:      Thyroid: No thyromegaly.      Vascular: No carotid bruit or JVD.      Trachea: No tracheal deviation.   Cardiovascular:      Rate and Rhythm: Normal rate and regular rhythm.      Heart sounds: Normal heart sounds. No murmur heard.  No friction rub. No gallop.    Pulmonary:      Effort: Pulmonary effort is normal.      Breath sounds: Normal breath sounds. No stridor. No decreased breath sounds, wheezing or rales.   Abdominal:      General: Bowel sounds are normal. There is no distension.      Palpations: Abdomen is soft. There is no " mass.      Tenderness: There is no abdominal tenderness. There is no guarding or rebound.      Hernia: No hernia is present.   Lymphadenopathy:      Head:      Right side of head: No submental, submandibular, tonsillar, preauricular, posterior auricular or occipital adenopathy.      Left side of head: No submental, submandibular, tonsillar, preauricular, posterior auricular or occipital adenopathy.      Cervical: No cervical adenopathy.   Skin:     General: Skin is warm and dry.      Coloration: Skin is not pale.   Neurological:      Mental Status: She is alert and oriented to person, place, and time.      Cranial Nerves: No cranial nerve deficit.      Sensory: No sensory deficit.      Coordination: Coordination normal.      Gait: Gait normal.      Deep Tendon Reflexes: Reflexes are normal and symmetric.         Data / Lab Results:    No results found for: HGBA1C     Lab Results   Component Value Date    LDL 93 05/15/2020     No results found for: CHOL  Lab Results   Component Value Date    TRIG 119 05/15/2020     Lab Results   Component Value Date    HDL 54 05/15/2020     No results found for: PSA  Lab Results   Component Value Date    WBC 4.60 10/18/2020    HGB 14.5 10/18/2020    HCT 43.5 10/18/2020    MCV 86.5 10/18/2020     10/18/2020     Lab Results   Component Value Date    TSH 3.400 08/14/2020      Lab Results   Component Value Date    GLUCOSE 112 (H) 10/18/2020    BUN 15 10/18/2020    CREATININE 0.82 10/18/2020    EGFRIFNONA 72 10/18/2020    EGFRIFAFRI 72 05/15/2020    BCR 18.3 10/18/2020    K 4.0 10/18/2020    CO2 27.0 10/18/2020    CALCIUM 9.8 10/18/2020    PROTENTOTREF 7.1 05/15/2020    ALBUMIN 4.6 05/15/2020    LABIL2 1.8 05/15/2020    AST 20 05/15/2020    ALT 20 05/15/2020     No results found for: VOLODYMYR, RF, SEDRATE   No results found for: CRP   No results found for: IRON, TIBC, FERRITIN   Lab Results   Component Value Date    [id number] 610 05/15/2020        Age-appropriate Screening  Schedule:  Refer to the list below for future screening recommendations based on patient's age, sex and/or medical conditions. Orders for these recommended tests are listed in the plan section. The patient has been provided with a written plan.    Health Maintenance   Topic Date Due   • ZOSTER VACCINE (1 of 2) Never done   • TDAP/TD VACCINES (3 - Td or Tdap) 10/22/2019   • PAP SMEAR  Never done   • MAMMOGRAM  09/05/2020   • INFLUENZA VACCINE  08/01/2021   • LIPID PANEL  12/03/2021           Assessment/Plan      Medications        Problem List         LOS    Physical.  Doing well, vaccines current.  Coated baby aspirin daily.  Discussed calcium and vitamin D.  Discussed health maintenance, screening test, lifestyle modification.  Followed by OB/GYN Dr. Shook mammogram current.  History of tobacco use.  Significant history, has quit. CT scan lung cancer screening benign 2020.  EKG benign 2020.  Fatigue.  Replacing vitamin D, recheck levels.  Start regular exercise program.  Weight gain.  Following quitting smoking.  Discussed diet, exercise, lifestyle modification.  History of intolerance to Wellbutrin, not tolerating Topamax.  Follow-up recheck.  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  Add melatonin  Colon screening.  Colonoscopy 2017 with diverticulosis only.  COVID-19 viral infection.    Improved/resolved.  October/20.  Doing well currently, resolved.  Call return if recurrent symptoms.  Has been vaccinated/recommend booster.  Low back pain.  OA hips contributing.  Ice, NSAIDs, rehabilitation exercise discussed.  Consider imaging, PT referral if persistent symptoms.    Diagnoses and all orders for this visit:    1. Annual visit for general adult medical examination with abnormal findings (Primary)    2. Mixed hyperlipidemia    3. Shift work sleep disorder  -     modafinil (Provigil) 100 MG tablet; Take 1 tablet by mouth Daily.  Dispense: 30 tablet; Refill: 2    4. Overweight with body mass index (BMI) of 27  to 27.9 in adult    5. History of tobacco use    6. Screening for malignant neoplasm of cervix    7. Screening for malignant neoplasm of colon    8. Encounter for screening mammogram for malignant neoplasm of breast    9. Acute back pain, unspecified back location, unspecified back pain laterality  -     ibuprofen (ADVIL,MOTRIN) 800 MG tablet; Take 1 tablet by mouth Every 6 (Six) Hours As Needed for Mild Pain .  Dispense: 90 tablet; Refill: 3              Expected course, medications, and adverse effects discussed.  Call or return if worsening or persistent symptoms.  I wore protective equipment throughout this patient encounter including a mask, gloves, and eye protection.  Hand hygiene was performed before donning protective equipment and after removal when leaving the room. The complete contents of the Assessment and Plan and Data / Lab Results as documented above have been reviewed and addressed by myself with the patient today as part of an ongoing evaluation / treatment plan.  If some of the documentation has been copied from a previous note and is unchanged it indicates that this problem / plan has been assessed today but is stable from a previous visit and no changes have been recommended.

## 2022-02-15 ENCOUNTER — TELEPHONE (OUTPATIENT)
Dept: FAMILY MEDICINE CLINIC | Facility: CLINIC | Age: 62
End: 2022-02-15

## 2022-03-02 DIAGNOSIS — M54.9 ACUTE BACK PAIN, UNSPECIFIED BACK LOCATION, UNSPECIFIED BACK PAIN LATERALITY: ICD-10-CM

## 2022-03-02 RX ORDER — IBUPROFEN 800 MG/1
800 TABLET ORAL EVERY 6 HOURS PRN
Qty: 90 TABLET | Refills: 3 | Status: SHIPPED | OUTPATIENT
Start: 2022-03-02 | End: 2022-03-22 | Stop reason: SDUPTHER

## 2022-03-22 DIAGNOSIS — G47.26 SHIFT WORK SLEEP DISORDER: ICD-10-CM

## 2022-03-22 DIAGNOSIS — M54.9 ACUTE BACK PAIN, UNSPECIFIED BACK LOCATION, UNSPECIFIED BACK PAIN LATERALITY: ICD-10-CM

## 2022-03-22 NOTE — TELEPHONE ENCOUNTER
"    Caller: Sariah Arnold    Relationship: Self    Best call back number: [phone]    Requested Prescriptions:   Requested Prescriptions     Pending Prescriptions Disp Refills   • modafinil (Provigil) 100 MG tablet 30 tablet 2     Sig: Take 1 tablet by mouth Daily.   • ibuprofen (ADVIL,MOTRIN) 800 MG tablet 90 tablet 3     Sig: Take 1 tablet by mouth Every 6 (Six) Hours As Needed for Mild Pain .        Pharmacy where request should be sent: [facility]/PHARMACY #[id number] - [place], [place] [address] AT [facility]"C\" [facility] [phone] [facility] [phone]      Additional details provided by patient: PLEASE SEND TO NEW PHARMACY    Does the patient have less than a 3 day supply:  [] Yes  [x] No    Ghazala Childress Rep   03/22/22 12:38 EDT       "

## 2022-03-25 RX ORDER — MODAFINIL 100 MG/1
100 TABLET ORAL DAILY
Qty: 30 TABLET | Refills: 2 | Status: SHIPPED | OUTPATIENT
Start: 2022-03-25 | End: 2022-06-27

## 2022-03-25 RX ORDER — IBUPROFEN 800 MG/1
800 TABLET ORAL EVERY 6 HOURS PRN
Qty: 90 TABLET | Refills: 3 | Status: SHIPPED | OUTPATIENT
Start: 2022-03-25 | End: 2022-08-01

## 2022-05-06 ENCOUNTER — OFFICE VISIT (OUTPATIENT)
Dept: FAMILY MEDICINE CLINIC | Facility: CLINIC | Age: 62
End: 2022-05-06

## 2022-05-06 VITALS
HEART RATE: 98 BPM | RESPIRATION RATE: 14 BRPM | BODY MASS INDEX: 30.41 KG/M2 | WEIGHT: 189.2 LBS | OXYGEN SATURATION: 99 % | TEMPERATURE: 98.4 F | DIASTOLIC BLOOD PRESSURE: 80 MMHG | HEIGHT: 66 IN | SYSTOLIC BLOOD PRESSURE: 130 MMHG

## 2022-05-06 DIAGNOSIS — E66.09 CLASS 1 OBESITY DUE TO EXCESS CALORIES WITHOUT SERIOUS COMORBIDITY WITH BODY MASS INDEX (BMI) OF 30.0 TO 30.9 IN ADULT: ICD-10-CM

## 2022-05-06 DIAGNOSIS — B00.1 RECURRENT COLD SORES: ICD-10-CM

## 2022-05-06 DIAGNOSIS — G47.26 SHIFT WORK SLEEP DISORDER: ICD-10-CM

## 2022-05-06 DIAGNOSIS — H92.02 EARACHE ON LEFT: Primary | ICD-10-CM

## 2022-05-06 DIAGNOSIS — B00.9 HERPES: ICD-10-CM

## 2022-05-06 DIAGNOSIS — Z87.891 HISTORY OF TOBACCO USE: ICD-10-CM

## 2022-05-06 PROCEDURE — 99213 OFFICE O/P EST LOW 20 MIN: CPT | Performed by: FAMILY MEDICINE

## 2022-05-06 RX ORDER — ACYCLOVIR 400 MG/1
400 TABLET ORAL
Qty: 25 TABLET | Refills: 11 | Status: SHIPPED | OUTPATIENT
Start: 2022-05-06

## 2022-05-06 RX ORDER — NEOMYCIN SULFATE, POLYMYXIN B SULFATE AND HYDROCORTISONE 10; 3.5; 1 MG/ML; MG/ML; [USP'U]/ML
3 SUSPENSION/ DROPS AURICULAR (OTIC) 2 TIMES DAILY
Qty: 10 ML | Refills: 2 | Status: SHIPPED | OUTPATIENT
Start: 2022-05-06

## 2022-05-06 RX ORDER — AZITHROMYCIN 250 MG/1
TABLET, FILM COATED ORAL
Qty: 6 TABLET | Refills: 0 | Status: SHIPPED | OUTPATIENT
Start: 2022-05-06 | End: 2022-08-12

## 2022-05-06 RX ORDER — ACYCLOVIR 50 MG/G
1 OINTMENT TOPICAL 2 TIMES DAILY PRN
Qty: 30 G | Refills: 11 | Status: SHIPPED | OUTPATIENT
Start: 2022-05-06

## 2022-05-06 NOTE — ASSESSMENT & PLAN NOTE
BMI is >= 30 and <= 34.9 (Class 1 obesity). The following options were offered after discussion: exercise counseling/recommendations and nutrition counseling/recommendations

## 2022-05-06 NOTE — PROGRESS NOTES
Subjective   Sariah Arnold is a 61 y.o. female.     Chief Complaint   Patient presents with   • Earache   • Mouth Lesions       Earache   There is pain in the left ear. This is a new problem. The current episode started yesterday. The problem occurs constantly. The problem has been unchanged. There has been no fever. The pain is at a severity of 4/10. The pain is mild. Associated symptoms include hearing loss and a sore throat. Pertinent negatives include no abdominal pain, coughing, ear discharge, headaches, neck pain, rash or vomiting. Associated symptoms comments: Dizziness  . She has tried nothing for the symptoms.   Mouth Lesions   The current episode started more than 1 week ago. The onset was gradual. The problem occurs occasionally. The problem has been gradually improving. The problem is moderate. The symptoms are relieved by one or more OTC medications and one or more prescription drugs. The symptoms are aggravated by light (heat). Associated symptoms include ear pain, hearing loss, mouth sores and sore throat. Pertinent negatives include no fever, no abdominal pain, no nausea, no vomiting, no ear discharge, no headaches, no swollen glands, no neck pain, no cough and no rash.            I personally reviewed and updated the patient's allergies, medications, problem list, and past medical, surgical, social, and family history. I have reviewed and confirmed the accuracy of the History of Present Illness and Review of Symptoms as documented by the MA/LEIGHN/RN. Ever Champagne MD    Family History   Problem Relation Age of Onset   • Hypertension Mother    • Melanoma Sister    • Thyroid cancer Sister    • Diabetes Paternal Grandmother    • Diabetes Paternal Grandfather    • Cancer Half-Sister         thyroid cancer       Social History     Tobacco Use   • Smoking status: Former Smoker     Packs/day: 1.50     Years: 35.00     Pack years: 52.50     Types: Cigarettes, Electronic Cigarette     Start date: 1975      Quit date: 2012     Years since quitting: 10.3   • Smokeless tobacco: Never Used   Vaping Use   • Vaping Use: Former   Substance Use Topics   • Alcohol use: Yes   • Drug use: Never       Past Surgical History:   Procedure Laterality Date   • HYSTERECTOMY         Patient Active Problem List   Diagnosis   • History of tobacco use   • Other fatigue   • Class 1 obesity due to excess calories without serious comorbidity with body mass index (BMI) of 30.0 to 30.9 in adult   • Other headache syndrome   • Arthritis   • Annual visit for general adult medical examination with abnormal findings   • Screening for malignant neoplasm of colon   • Screening for malignant neoplasm of cervix   • Encounter for screening for malignant neoplasm of breast   • Shift work sleep disorder   • Herpes   • Recurrent cold sores         Current Outpatient Medications:   •  calcium carbonate (OS-FLORENTIN) 600 MG tablet, Take 600 mg by mouth 3 (Three) Times a Day With Meals., Disp: , Rfl:   •  ibuprofen (ADVIL,MOTRIN) 800 MG tablet, Take 1 tablet by mouth Every 6 (Six) Hours As Needed for Mild Pain ., Disp: 90 tablet, Rfl: 3  •  modafinil (Provigil) 100 MG tablet, Take 1 tablet by mouth Daily., Disp: 30 tablet, Rfl: 2  •  vitamin E 400 UNIT capsule, Take 400 Units by mouth Daily., Disp: , Rfl:   •  acyclovir (ZOVIRAX) 400 MG tablet, Take 1 tablet by mouth 5 (Five) Times a Day., Disp: 25 tablet, Rfl: 11  •  acyclovir (ZOVIRAX) 5 % ointment, Apply 1 application topically to the appropriate area as directed 2 (Two) Times a Day As Needed (cold sores). Begin treatment at earliest sign or symptom., Disp: 30 g, Rfl: 11  •  azithromycin (ZITHROMAX) 250 MG tablet, Take 2 tablets the first day, then 1 tablet daily for 4 days., Disp: 6 tablet, Rfl: 0  •  estradiol (VIVELLE-DOT) 0.0375 MG/24HR, , Disp: , Rfl:   •  neomycin-polymyxin-hydrocortisone (CORTISPORIN) 3.5-27569-2 otic suspension, Administer 3 drops into ear(s) as directed by provider 2 (Two) Times a  "Day., Disp: 10 mL, Rfl: 2  •  topiramate (TOPAMAX) 50 MG tablet, Take 1 tablet nightly, Disp: 90 tablet, Rfl: 3  •  valACYclovir (VALTREX) 1000 MG tablet, valacyclovir 1 gram tablet, Disp: , Rfl:          Review of Systems   Constitutional: Negative for chills, diaphoresis and fever.   HENT: Positive for ear pain, hearing loss, mouth sores and sore throat. Negative for ear discharge and swollen glands.    Eyes: Negative for visual disturbance.   Respiratory: Negative for cough and shortness of breath.    Cardiovascular: Negative for chest pain and palpitations.   Gastrointestinal: Negative for abdominal pain, nausea and vomiting.   Endocrine: Negative for polydipsia and polyphagia.   Musculoskeletal: Negative for neck pain and neck stiffness.   Skin: Negative for color change, pallor and rash.   Neurological: Negative for seizures and syncope.   Hematological: Negative for adenopathy.       I have reviewed and confirmed the accuracy of the ROS as documented by the MA/LPN/RN Ever Champagne MD      Objective   /80   Pulse 98   Temp 98.4 °F (36.9 °C)   Resp 14   Ht 167.6 cm (65.98\")   Wt 85.8 kg (189 lb 3.2 oz)   SpO2 99%   BMI 30.55 kg/m²   BP Readings from Last 3 Encounters:   05/06/22 130/80   12/03/21 122/84   10/18/20 117/76     Wt Readings from Last 3 Encounters:   05/06/22 85.8 kg (189 lb 3.2 oz)   12/03/21 89.3 kg (196 lb 12.8 oz)   11/13/20 81.6 kg (180 lb)     Physical Exam  Constitutional:       Appearance: Normal appearance. She is well-developed. She is not diaphoretic.   HENT:      Head: Normocephalic.      Right Ear: Hearing, ear canal and external ear normal. A middle ear effusion is present. Tympanic membrane is erythematous.      Left Ear: Hearing, ear canal and external ear normal. A middle ear effusion is present. Tympanic membrane is erythematous.      Nose: Congestion present.      Right Sinus: Maxillary sinus tenderness and frontal sinus tenderness present.      Left Sinus: Maxillary " sinus tenderness and frontal sinus tenderness present.      Mouth/Throat:      Pharynx: Posterior oropharyngeal erythema present.      Tonsils: No tonsillar abscesses. 1+ on the right. 1+ on the left.   Eyes:      General: Lids are normal.      Conjunctiva/sclera: Conjunctivae normal.      Pupils: Pupils are equal, round, and reactive to light.   Neck:      Meningeal: Brudzinski's sign and Kernig's sign absent.   Cardiovascular:      Rate and Rhythm: Normal rate and regular rhythm.      Pulses: Normal pulses.      Heart sounds: Normal heart sounds, S1 normal and S2 normal. No murmur heard.    No friction rub. No gallop.   Pulmonary:      Effort: Pulmonary effort is normal. No accessory muscle usage or respiratory distress.      Breath sounds: No stridor. Examination of the right-upper field reveals wheezing and rhonchi. Examination of the left-upper field reveals wheezing and rhonchi. Examination of the right-middle field reveals wheezing and rhonchi. Examination of the left-middle field reveals wheezing and rhonchi. Examination of the right-lower field reveals wheezing and rhonchi. Examination of the left-lower field reveals wheezing and rhonchi. Wheezing and rhonchi present. No decreased breath sounds or rales.   Abdominal:      General: Bowel sounds are normal. There is no distension.      Palpations: Abdomen is soft. There is no mass.      Tenderness: There is no abdominal tenderness.      Hernia: No hernia is present.   Skin:     General: Skin is warm and dry.      Coloration: Skin is not pale.   Neurological:      Mental Status: She is alert and oriented to person, place, and time.      Cranial Nerves: No cranial nerve deficit.      Coordination: Coordination normal.      Gait: Gait normal.         Data / Lab Results:    No results found for: HGBA1C     Lab Results   Component Value Date    LDL 93 05/15/2020     No results found for: CHOL  Lab Results   Component Value Date    TRIG 119 05/15/2020     Lab  Results   Component Value Date    HDL 54 05/15/2020     No results found for: PSA  Lab Results   Component Value Date    WBC 4.60 10/18/2020    HGB 14.5 10/18/2020    HCT 43.5 10/18/2020    MCV 86.5 10/18/2020     10/18/2020     Lab Results   Component Value Date    TSH 3.400 08/14/2020      Lab Results   Component Value Date    GLUCOSE 112 (H) 10/18/2020    BUN 15 10/18/2020    CREATININE 0.82 10/18/2020    EGFRIFNONA 72 10/18/2020    EGFRIFAFRI 72 05/15/2020    BCR 18.3 10/18/2020    K 4.0 10/18/2020    CO2 27.0 10/18/2020    CALCIUM 9.8 10/18/2020    PROTENTOTREF 7.1 05/15/2020    ALBUMIN 4.6 05/15/2020    LABIL2 1.8 05/15/2020    AST 20 05/15/2020    ALT 20 05/15/2020     No results found for: VOLODYMYR, RF, SEDRATE   No results found for: CRP   No results found for: IRON, TIBC, FERRITIN   Lab Results   Component Value Date    SUQCUMZA65 610 05/15/2020          Assessment & Plan      Medications        Problem List         LOS      Health maintenance.  Doing well, vaccines current.  Coated baby aspirin daily.  Discussed calcium and vitamin D.  Discussed health maintenance, screening test, lifestyle modification.  Followed by OB/GYN Dr. Shook mammogram current.  History of tobacco use.  Significant history, has quit. CT scan lung cancer screening benign 2020.  EKG benign 2020.  Fatigue.  Replacing vitamin D, recheck levels.  Start regular exercise program.  Weight gain.  Following quitting smoking.  Discussed diet, exercise, lifestyle modification.  History of intolerance to Wellbutrin, not tolerating Topamax.  Follow-up recheck.  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  Add melatonin  Colon screening.  Colonoscopy 2017 with diverticulosis only.  COVID-19 viral infection.    Improved/resolved.  October/20.  Doing well currently, resolved.  Call return if recurrent symptoms.  Has been vaccinated/recommend booster.  Low back pain.  OA hips contributing.  Ice, NSAIDs, rehabilitation exercise discussed.   Consider imaging, PT referral if persistent symptoms.  Acute bacterial sinusitis.  Start antibiotics.  Allergic rhinitis.  OTC Claritin or Zyrtec.  Cold sores.  Start as needed acyclovir.      Diagnoses and all orders for this visit:    1. Earache on left (Primary)  -     azithromycin (ZITHROMAX) 250 MG tablet; Take 2 tablets the first day, then 1 tablet daily for 4 days.  Dispense: 6 tablet; Refill: 0  -     neomycin-polymyxin-hydrocortisone (CORTISPORIN) 3.5-45429-3 otic suspension; Administer 3 drops into ear(s) as directed by provider 2 (Two) Times a Day.  Dispense: 10 mL; Refill: 2    2. Herpes  -     acyclovir (ZOVIRAX) 400 MG tablet; Take 1 tablet by mouth 5 (Five) Times a Day.  Dispense: 25 tablet; Refill: 11  -     acyclovir (ZOVIRAX) 5 % ointment; Apply 1 application topically to the appropriate area as directed 2 (Two) Times a Day As Needed (cold sores). Begin treatment at earliest sign or symptom.  Dispense: 30 g; Refill: 11    3. History of tobacco use    4. Class 1 obesity due to excess calories without serious comorbidity with body mass index (BMI) of 30.0 to 30.9 in adult  Assessment & Plan:    BMI is >= 30 and <= 34.9 (Class 1 obesity). The following options were offered after discussion: exercise counseling/recommendations and nutrition counseling/recommendations        5. Recurrent cold sores    6. Shift work sleep disorder            Expected course, medications, and adverse effects discussed.  Call or return if worsening or persistent symptoms.  I wore protective equipment throughout this patient encounter including a mask, gloves, and eye protection.  Hand hygiene was performed before donning protective equipment and after removal when leaving the room. The complete contents of the Assessment and Plan and Data/Lab Results as documented above have been reviewed and addressed by myself with the patient today as part of an ongoing evaluation / treatment plan.  If some of the documentation has been  copied from a previous note and is unchanged it indicates that this problem / plan has been assessed today but is stable from a previous visit and no changes have been recommended.

## 2022-05-12 PROBLEM — E66.811 CLASS 1 OBESITY DUE TO EXCESS CALORIES WITHOUT SERIOUS COMORBIDITY WITH BODY MASS INDEX (BMI) OF 30.0 TO 30.9 IN ADULT: Status: ACTIVE | Noted: 2020-01-31

## 2022-05-12 PROBLEM — E66.09 CLASS 1 OBESITY DUE TO EXCESS CALORIES WITHOUT SERIOUS COMORBIDITY WITH BODY MASS INDEX (BMI) OF 30.0 TO 30.9 IN ADULT: Status: ACTIVE | Noted: 2020-01-31

## 2022-06-25 DIAGNOSIS — G47.26 SHIFT WORK SLEEP DISORDER: ICD-10-CM

## 2022-06-27 RX ORDER — MODAFINIL 100 MG/1
TABLET ORAL
Qty: 30 TABLET | Refills: 2 | Status: SHIPPED | OUTPATIENT
Start: 2022-06-27 | End: 2022-08-12 | Stop reason: SDUPTHER

## 2022-07-30 DIAGNOSIS — M54.9 ACUTE BACK PAIN, UNSPECIFIED BACK LOCATION, UNSPECIFIED BACK PAIN LATERALITY: ICD-10-CM

## 2022-08-01 RX ORDER — IBUPROFEN 800 MG/1
800 TABLET ORAL EVERY 6 HOURS PRN
Qty: 90 TABLET | Refills: 3 | Status: SHIPPED | OUTPATIENT
Start: 2022-08-01 | End: 2023-03-17

## 2022-08-12 ENCOUNTER — OFFICE VISIT (OUTPATIENT)
Dept: FAMILY MEDICINE CLINIC | Facility: CLINIC | Age: 62
End: 2022-08-12

## 2022-08-12 VITALS
WEIGHT: 187.25 LBS | DIASTOLIC BLOOD PRESSURE: 78 MMHG | BODY MASS INDEX: 30.09 KG/M2 | TEMPERATURE: 97.8 F | HEIGHT: 66 IN | SYSTOLIC BLOOD PRESSURE: 120 MMHG | OXYGEN SATURATION: 98 % | HEART RATE: 83 BPM | RESPIRATION RATE: 18 BRPM

## 2022-08-12 DIAGNOSIS — E66.09 CLASS 1 OBESITY DUE TO EXCESS CALORIES WITHOUT SERIOUS COMORBIDITY WITH BODY MASS INDEX (BMI) OF 30.0 TO 30.9 IN ADULT: ICD-10-CM

## 2022-08-12 DIAGNOSIS — E78.49 OTHER HYPERLIPIDEMIA: Primary | ICD-10-CM

## 2022-08-12 DIAGNOSIS — M77.11 LATERAL EPICONDYLITIS OF RIGHT ELBOW: ICD-10-CM

## 2022-08-12 DIAGNOSIS — Z87.891 HISTORY OF TOBACCO USE: ICD-10-CM

## 2022-08-12 DIAGNOSIS — S59.901A ELBOW INJURY, RIGHT, INITIAL ENCOUNTER: ICD-10-CM

## 2022-08-12 DIAGNOSIS — G47.26 SHIFT WORK SLEEP DISORDER: ICD-10-CM

## 2022-08-12 PROCEDURE — 20605 DRAIN/INJ JOINT/BURSA W/O US: CPT | Performed by: FAMILY MEDICINE

## 2022-08-12 PROCEDURE — 99213 OFFICE O/P EST LOW 20 MIN: CPT | Performed by: FAMILY MEDICINE

## 2022-08-12 RX ORDER — MODAFINIL 100 MG/1
TABLET ORAL
Qty: 60 TABLET | Refills: 2 | Status: SHIPPED | OUTPATIENT
Start: 2022-08-12 | End: 2022-11-04

## 2022-08-12 NOTE — ASSESSMENT & PLAN NOTE
BMI is >= 30 and <35. (Class 1 Obesity). The following options were offered after discussion;: exercise counseling/recommendations and nutrition counseling/recommendations

## 2022-08-12 NOTE — PROGRESS NOTES
Subjective   Sariah Arnold is a 61 y.o. female.     Chief Complaint   Patient presents with   • Hyperlipidemia   • Elbow Injury       Hyperlipidemia  This is a new problem. The current episode started more than 1 month ago. Exacerbating diseases include obesity. There are no known factors aggravating her hyperlipidemia. Associated symptoms include leg pain. Pertinent negatives include no chest pain or shortness of breath. She is currently on no antihyperlipidemic treatment. There are no compliance problems.  Risk factors for coronary artery disease include obesity.   Elbow Injury  This is a new problem. The current episode started more than 1 month ago. The problem occurs constantly. The problem has been unchanged. Associated symptoms include joint swelling and weakness. Pertinent negatives include no abdominal pain, chest pain, chills, diaphoresis or nausea. The symptoms are aggravated by bending and twisting. Treatments tried: KT tape, Brace. The treatment provided mild relief.            I personally reviewed and updated the patient's allergies, medications, problem list, and past medical, surgical, social, and family history. I have reviewed and confirmed the accuracy of the History of Present Illness and Review of Symptoms as documented by the MA/LPN/RN. Ever Champagne MD    Family History   Problem Relation Age of Onset   • Hypertension Mother    • Melanoma Sister    • Thyroid cancer Sister    • Diabetes Paternal Grandmother    • Diabetes Paternal Grandfather    • Cancer Half-Sister         thyroid cancer       Social History     Tobacco Use   • Smoking status: Former Smoker     Packs/day: 1.50     Years: 35.00     Pack years: 52.50     Types: Cigarettes, Electronic Cigarette     Start date: 1975     Quit date: 2012     Years since quitting: 10.6   • Smokeless tobacco: Never Used   Vaping Use   • Vaping Use: Former   Substance Use Topics   • Alcohol use: Yes   • Drug use: Never       Past Surgical History:    Procedure Laterality Date   • HYSTERECTOMY         Patient Active Problem List   Diagnosis   • History of tobacco use   • Other fatigue   • Class 1 obesity due to excess calories without serious comorbidity with body mass index (BMI) of 30.0 to 30.9 in adult   • Other headache syndrome   • Arthritis   • Annual visit for general adult medical examination with abnormal findings   • Screening for malignant neoplasm of colon   • Screening for malignant neoplasm of cervix   • Encounter for screening for malignant neoplasm of breast   • Shift work sleep disorder   • Herpes   • Recurrent cold sores   • Elbow injury, right, initial encounter   • Other hyperlipidemia         Current Outpatient Medications:   •  acyclovir (ZOVIRAX) 400 MG tablet, Take 1 tablet by mouth 5 (Five) Times a Day., Disp: 25 tablet, Rfl: 11  •  acyclovir (ZOVIRAX) 5 % ointment, Apply 1 application topically to the appropriate area as directed 2 (Two) Times a Day As Needed (cold sores). Begin treatment at earliest sign or symptom., Disp: 30 g, Rfl: 11  •  calcium carbonate (OS-FLORENTIN) 600 MG tablet, Take 600 mg by mouth 3 (Three) Times a Day With Meals., Disp: , Rfl:   •  ibuprofen (ADVIL,MOTRIN) 800 MG tablet, TAKE 1 TABLET BY MOUTH EVERY 6 (SIX) HOURS AS NEEDED FOR MILD PAIN, Disp: 90 tablet, Rfl: 3  •  modafinil (PROVIGIL) 100 MG tablet, TAKE 1 TABLET BY MOUTH EVERY DAY, Disp: 30 tablet, Rfl: 2  •  neomycin-polymyxin-hydrocortisone (CORTISPORIN) 3.5-85010-3 otic suspension, Administer 3 drops into ear(s) as directed by provider 2 (Two) Times a Day., Disp: 10 mL, Rfl: 2  •  valACYclovir (VALTREX) 1000 MG tablet, valacyclovir 1 gram tablet, Disp: , Rfl:   •  vitamin E 400 UNIT capsule, Take 400 Units by mouth Daily., Disp: , Rfl:          Review of Systems   Constitutional: Negative for chills and diaphoresis.   Eyes: Negative for visual disturbance.   Respiratory: Negative for shortness of breath.    Cardiovascular: Negative for chest pain and  "palpitations.   Gastrointestinal: Negative for abdominal pain and nausea.   Endocrine: Negative for polydipsia and polyphagia.   Musculoskeletal: Positive for joint swelling. Negative for neck stiffness.   Skin: Negative for color change and pallor.   Neurological: Positive for weakness. Negative for seizures and syncope.   Hematological: Negative for adenopathy.   Psychiatric/Behavioral: Negative for hallucinations and suicidal ideas.       I have reviewed and confirmed the accuracy of the ROS as documented by the MA/LPN/RN Ever Champagne MD      Objective   /78 (BP Location: Right arm, Patient Position: Sitting, Cuff Size: Adult)   Pulse 83   Temp 97.8 °F (36.6 °C) (Temporal)   Resp 18   Ht 167.6 cm (66\")   Wt 84.9 kg (187 lb 4 oz)   SpO2 98% Comment: room air  BMI 30.22 kg/m²   BP Readings from Last 3 Encounters:   08/12/22 120/78   05/06/22 130/80   12/03/21 122/84     Wt Readings from Last 3 Encounters:   08/12/22 84.9 kg (187 lb 4 oz)   05/06/22 85.8 kg (189 lb 3.2 oz)   12/03/21 89.3 kg (196 lb 12.8 oz)     Physical Exam  Constitutional:       Appearance: Normal appearance. She is well-developed. She is not diaphoretic.   Cardiovascular:      Rate and Rhythm: Normal rate and regular rhythm.      Pulses: Normal pulses.      Heart sounds: Normal heart sounds, S1 normal and S2 normal. No murmur heard.    No friction rub. No gallop.   Pulmonary:      Effort: Pulmonary effort is normal. No accessory muscle usage.      Breath sounds: Normal breath sounds. No stridor. No decreased breath sounds, wheezing, rhonchi or rales.   Abdominal:      General: Bowel sounds are normal. There is no distension.      Palpations: Abdomen is soft. Abdomen is not rigid. There is no mass or pulsatile mass.      Tenderness: There is no abdominal tenderness. There is no guarding or rebound. Negative signs include Balderas's sign.      Hernia: No hernia is present.   Musculoskeletal:      Right elbow: Normal. No swelling, " deformity or effusion. Normal range of motion. No tenderness. No radial head, medial epicondyle, lateral epicondyle or olecranon process tenderness.      Left elbow: Normal. No swelling, deformity, effusion or lacerations. Normal range of motion. No tenderness. No radial head, medial epicondyle, lateral epicondyle or olecranon process tenderness.   Skin:     General: Skin is warm and dry.      Coloration: Skin is not pale.   Neurological:      Mental Status: She is alert and oriented to person, place, and time.      Coordination: Coordination normal.      Gait: Gait normal.         Data / Lab Results:    No results found for: HGBA1C     Lab Results   Component Value Date     (H) 08/06/2022    LDL 93 05/15/2020     No results found for: CHOL  Lab Results   Component Value Date    TRIG 124 08/06/2022    TRIG 119 05/15/2020     Lab Results   Component Value Date    HDL 51 08/06/2022    HDL 54 05/15/2020     No results found for: PSA  Lab Results   Component Value Date    WBC 8.2 08/06/2022    HGB 13.5 08/06/2022    HCT 41.6 08/06/2022    MCV 90 08/06/2022     08/06/2022     Lab Results   Component Value Date    TSH 3.340 08/06/2022      Lab Results   Component Value Date    GLUCOSE 101 (H) 08/06/2022    BUN 15 08/06/2022    CREATININE 0.92 08/06/2022    EGFRIFNONA 72 10/18/2020    EGFRIFAFRI 72 05/15/2020    BCR 16 08/06/2022    K 4.4 08/06/2022    CO2 23 08/06/2022    CALCIUM 9.2 08/06/2022    PROTENTOTREF 6.6 08/06/2022    ALBUMIN 4.2 08/06/2022    LABIL2 1.8 08/06/2022    AST 22 08/06/2022    ALT 25 08/06/2022     No results found for: VOLODYMYR, RF, SEDRATE   No results found for: CRP   No results found for: IRON, TIBC, FERRITIN   Lab Results   Component Value Date    [id number] 787 08/06/2022          Assessment & Plan   Elbow Joint injection  Injection site: Right elbow  Date/Time: 08/12/2022 14:12 EDT  Performed by: Ever Champagne MD  Authorized by: Ever Champagne MD  Preparation: Patient was prepped  and draped in the usual sterile fashion.  Local anesthesia used: no   Anesthesia:  Local anesthesia used: no   Sedation:  Patient sedated: no    Medications Used:  0.5cc Marcain 25mg/10mL: [id number] [id number]   0.5cc DepoMedrol 40mg: NDC-0003-0293-28 [id number]- [id number]    Sariah Arnold tolerated procedure well.         Medications        Problem List         LOS  Health maintenance.  Doing well, vaccines current.  Coated baby aspirin daily.  Discussed calcium and vitamin D.  Discussed health maintenance, screening test, lifestyle modification.  Followed by OB/GYN Dr. Shook mammogram current.  History of tobacco use.  Significant history, has quit. CT scan lung cancer screening benign 2020.  EKG benign 2020.  Fatigue.  Replacing vitamin D, recheck levels.  Start regular exercise program.  Weight gain.  Improved today, has lost weight.  Following quitting smoking.  Discussed diet, exercise, lifestyle modification.  History of intolerance to Wellbutrin, not tolerating Topamax.  Follow-up recheck.  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  Add melatonin  Colon screening.  Colonoscopy 2017 with diverticulosis only.  COVID-19 viral infection.    Improved/resolved.  October/20.  Doing well currently, resolved.  Call return if recurrent symptoms.  Has been vaccinated/recommend booster.  Low back pain.  OA hips contributing.  Ice, NSAIDs, rehabilitation exercise discussed.  Consider imaging, PT referral if persistent symptoms.  Acute bacterial sinusitis.  Start antibiotics.  Allergic rhinitis.  OTC Claritin or Zyrtec.  Cold sores.  Start as needed acyclovir.  Lateral epicondylitis.  Attempting counterforce strap through Worker's [employer].  Injected today.  Call or return if persistent symptoms.    Diagnoses and all orders for this visit:    1. Other hyperlipidemia (Primary)    2. Elbow injury, right, initial encounter    3. Class 1 obesity due to excess calories without serious  comorbidity with body mass index (BMI) of 30.0 to 30.9 in adult  Assessment & Plan:  BMI is >= 30 and <35. (Class 1 Obesity). The following options were offered after discussion;: exercise counseling/recommendations and nutrition counseling/recommendations        4. History of tobacco use            Expected course, medications, and adverse effects discussed.  Call or return if worsening or persistent symptoms.  I wore protective equipment throughout this patient encounter including a mask, gloves, and eye protection.  Hand hygiene was performed before donning protective equipment and after removal when leaving the room. The complete contents of the Assessment and Plan and Data/Lab Results as documented above have been reviewed and addressed by myself with the patient today as part of an ongoing evaluation / treatment plan.  If some of the documentation has been copied from a previous note and is unchanged it indicates that this problem / plan has been assessed today but is stable from a previous visit and no changes have been recommended.

## 2022-08-17 ENCOUNTER — TELEPHONE (OUTPATIENT)
Dept: FAMILY MEDICINE CLINIC | Facility: CLINIC | Age: 62
End: 2022-08-17

## 2022-08-17 DIAGNOSIS — J06.9 UPPER RESPIRATORY TRACT INFECTION, UNSPECIFIED TYPE: Primary | ICD-10-CM

## 2022-08-17 RX ORDER — AZITHROMYCIN 250 MG/1
TABLET, FILM COATED ORAL
Qty: 6 TABLET | Refills: 0 | Status: SHIPPED | OUTPATIENT
Start: 2022-08-17 | End: 2023-03-17

## 2022-08-17 NOTE — TELEPHONE ENCOUNTER
"Caller: Sariah Arnold    Relationship: Self    Best call back number: [phone]    What medication are you requesting: Z-PACK    What are your current symptoms: SEEN IN OFFICE 8-12-22, SINUS INFECTION    How long have you been experiencing symptoms:    Have you had these symptoms before:    [] Yes  [] No    Have you been treated for these symptoms before:   [] Yes  [] No    If a prescription is needed, what is your preferred pharmacy and phone number: [facility] #[id number] - [place], [place] [address] AT [facility] [place] - [phone] [facility] [phone]      Additional notes: DR HORVATH ADVISED THAT HE WOULD PRESCRIBE Z-PACK, BUT IT WAS NOT SENT TO PHARMACY           "

## 2022-08-27 PROBLEM — M77.11 LATERAL EPICONDYLITIS OF RIGHT ELBOW: Status: ACTIVE | Noted: 2022-08-27

## 2022-11-04 DIAGNOSIS — G47.26 SHIFT WORK SLEEP DISORDER: ICD-10-CM

## 2022-11-04 RX ORDER — MODAFINIL 100 MG/1
TABLET ORAL
Qty: 30 TABLET | Refills: 2 | Status: SHIPPED | OUTPATIENT
Start: 2022-11-04

## 2023-03-15 NOTE — PROGRESS NOTES
"Subjective   Sariah Arnold is a 62 y.o. female.     Chief Complaint   Patient presents with   • Annual Exam   • Hyperlipidemia   • Foot Pain   • Earache       The patient is here: to discuss health maintenance and disease prevention.  Last comprehensive physical was on 12/3/2021.  Previous physical was performed by  Ever Champagne MD  Overall has: vigorous activity with work/home activities, good appetite, feels well with minor complaints, good energy level, is sleeping poorly and returned to full activity. Nutrition: appropriate balanced diet, balanced diet and eating a variety of foods. Last tetanus shot was 10/22/2009. Patient is doing routine self breast exams: occasionally Patient's last DEXA was on 4/24/2019.    Last Completed Mammogram          MAMMOGRAM (Every 2 Years) Next due on 2/18/2024 02/18/2022  SCANNED - MAMMO    12/11/2020  SCANNED - MAMMO    10/04/2019  SCANNED - MAMMO    09/27/2019  SCANNED - MAMMO    09/05/2018  SCANNED - MAMMO               Last Completed Colonoscopy          COLORECTAL CANCER SCREENING (COLONOSCOPY - Every 10 Years) Next due on 4/2/2028 04/02/2018  SCANNED - COLONOSCOPY                Hyperlipidemia  This is a new problem. The current episode started more than 1 month ago. Recent lipid tests were reviewed and are high. Exacerbating diseases include obesity. There are no known factors aggravating her hyperlipidemia. Associated symptoms include leg pain. Pertinent negatives include no chest pain or shortness of breath. She is currently on no antihyperlipidemic treatment. There are no compliance problems.  Risk factors for coronary artery disease include obesity.   Earache   There is pain in both (left is worse, than right) ears. This is a recurrent problem. There has been no fever. Pertinent negatives include no abdominal pain. Associated symptoms comments: Feels \"full\". She has tried ear drops for the symptoms. The treatment provided mild relief.   Foot Pain  This is a " new problem. The current episode started more than 1 month ago. The problem occurs constantly. The problem has been gradually worsening. Pertinent negatives include no abdominal pain, chest pain, chills, diaphoresis or nausea. Associated symptoms comments: Sariah stands on her feet constantly at work, and notices her feet have really been bothering her in the last 8 months. Constant pain. Currently, a 4/10 pain score but after work she states its a 10/10. The symptoms are aggravated by exertion, standing and walking. She has tried rest for the symptoms.        Recent Hospitalizations:  No hospitalization(s) within the last year..  ccc      I personally reviewed and updated the patient's allergies, medications, problem list, and past medical, surgical, social, and family history. I have reviewed and confirmed the accuracy of the HPI and ROS as documented by the MA/LPN/RN Ever Champagne MD    Family History   Problem Relation Age of Onset   • Hypertension Mother    • Melanoma Sister    • Thyroid cancer Sister    • Diabetes Paternal Grandmother    • Diabetes Paternal Grandfather    • Cancer Half-Sister         thyroid cancer       Social History     Tobacco Use   • Smoking status: Former     Packs/day: 1.50     Years: 35.00     Pack years: 52.50     Types: Cigarettes, Electronic Cigarette     Start date: 1/1/1975     Quit date: 1/1/2013     Years since quitting: 10.2   • Smokeless tobacco: Never   Vaping Use   • Vaping Use: Former   • Start date: 1/1/2011   • Quit date: 1/1/2013   • Substances: Nicotine, Flavoring   • Devices: Disposable   Substance Use Topics   • Alcohol use: Yes   • Drug use: Never       Past Surgical History:   Procedure Laterality Date   • BREAST SURGERY  1999    Begin tumor   • HYSTERECTOMY     • TONSILLECTOMY  1994       Patient Active Problem List   Diagnosis   • History of tobacco use   • Other fatigue   • Class 1 obesity due to excess calories without serious comorbidity with body mass index  (BMI) of 30.0 to 30.9 in adult   • Other headache syndrome   • Arthritis   • Annual visit for general adult medical examination with abnormal findings   • Screening for malignant neoplasm of colon   • Screening for malignant neoplasm of cervix   • Encounter for screening for malignant neoplasm of breast   • Shift work sleep disorder   • Herpes   • Recurrent cold sores   • Elbow injury, right, initial encounter   • Other hyperlipidemia   • Lateral epicondylitis of right elbow   • Plantar fasciitis         Current Outpatient Medications:   •  acyclovir (ZOVIRAX) 400 MG tablet, Take 1 tablet by mouth 5 (Five) Times a Day., Disp: 25 tablet, Rfl: 11  •  acyclovir (ZOVIRAX) 5 % ointment, Apply 1 application topically to the appropriate area as directed 2 (Two) Times a Day As Needed (cold sores). Begin treatment at earliest sign or symptom., Disp: 30 g, Rfl: 11  •  modafinil (PROVIGIL) 100 MG tablet, TAKE 1 TABLET BY MOUTH EVERY DAY, Disp: 30 tablet, Rfl: 2  •  multivitamin (MULTI-VITAMIN DAILY PO), Take  by mouth Daily., Disp: , Rfl:   •  neomycin-polymyxin-hydrocortisone (CORTISPORIN) 3.5-48113-6 otic suspension, Administer 3 drops into ear(s) as directed by provider 2 (Two) Times a Day., Disp: 10 mL, Rfl: 2  •  valACYclovir (VALTREX) 1000 MG tablet, valacyclovir 1 gram tablet, Disp: , Rfl:   •  loratadine (Claritin) 10 MG tablet, Take 1 tablet by mouth Daily., Disp: 30 tablet, Rfl: 5  •  predniSONE (DELTASONE) 5 MG tablet, 40mg x 3 days, 20mg x 3 days, 10mg x 3 days, 5mg x 3 days, Disp: 45 tablet, Rfl: 0  •  traMADol (ULTRAM) 50 MG tablet, Take 1 tablet by mouth Every 6 (Six) Hours As Needed for Moderate Pain., Disp: 60 tablet, Rfl: 0    Review of Systems   Constitutional: Negative for chills and diaphoresis.   HENT: Positive for ear pain. Negative for trouble swallowing and voice change.    Eyes: Negative for visual disturbance.   Respiratory: Negative for shortness of breath.    Cardiovascular: Negative for chest pain  "and palpitations.   Gastrointestinal: Negative for abdominal pain and nausea.   Endocrine: Negative for polydipsia and polyphagia.   Genitourinary: Negative for hematuria.   Musculoskeletal: Negative for neck stiffness.   Skin: Negative for color change and pallor.   Allergic/Immunologic: Negative for immunocompromised state.   Neurological: Negative for seizures and syncope.   Hematological: Negative for adenopathy.   Psychiatric/Behavioral: Negative for hallucinations, sleep disturbance and suicidal ideas.       I have reviewed and confirmed the accuracy of the ROS as documented by the MA/LPN/RN Ever Champagne MD      Objective   /80 (BP Location: Right arm, Patient Position: Sitting, Cuff Size: Adult)   Pulse 82   Temp 97.3 °F (36.3 °C)   Resp 16   Ht 167.6 cm (66\")   Wt 85.1 kg (187 lb 9.6 oz)   SpO2 97%   BMI 30.28 kg/m²   BP Readings from Last 3 Encounters:   03/17/23 130/80   08/12/22 120/78   05/06/22 130/80     Wt Readings from Last 3 Encounters:   03/17/23 85.1 kg (187 lb 9.6 oz)   08/12/22 84.9 kg (187 lb 4 oz)   05/06/22 85.8 kg (189 lb 3.2 oz)     Physical Exam  Constitutional:       Appearance: She is well-developed. She is not diaphoretic.   HENT:      Head: Normocephalic.      Right Ear: Tympanic membrane, ear canal and external ear normal.      Left Ear: Tympanic membrane, ear canal and external ear normal.      Nose: Nose normal.   Eyes:      General: Lids are normal.      Conjunctiva/sclera: Conjunctivae normal.      Pupils: Pupils are equal, round, and reactive to light.   Neck:      Thyroid: No thyromegaly.      Vascular: No carotid bruit or JVD.      Trachea: No tracheal deviation.   Cardiovascular:      Rate and Rhythm: Normal rate and regular rhythm.      Heart sounds: Normal heart sounds. No murmur heard.    No friction rub. No gallop.   Pulmonary:      Effort: Pulmonary effort is normal.      Breath sounds: Normal breath sounds. No stridor. No decreased breath sounds, wheezing " or rales.   Abdominal:      General: Bowel sounds are normal. There is no distension.      Palpations: Abdomen is soft. There is no mass.      Tenderness: There is no abdominal tenderness. There is no guarding or rebound.      Hernia: No hernia is present.   Lymphadenopathy:      Head:      Right side of head: No submental, submandibular, tonsillar, preauricular, posterior auricular or occipital adenopathy.      Left side of head: No submental, submandibular, tonsillar, preauricular, posterior auricular or occipital adenopathy.      Cervical: No cervical adenopathy.   Skin:     General: Skin is warm and dry.      Coloration: Skin is not pale.   Neurological:      Mental Status: She is alert and oriented to person, place, and time.      Cranial Nerves: No cranial nerve deficit.      Sensory: No sensory deficit.      Coordination: Coordination normal.      Gait: Gait normal.      Deep Tendon Reflexes: Reflexes are normal and symmetric.         Data / Lab Results:    No results found for: HGBA1C     Lab Results   Component Value Date     (H) 03/17/2023     (H) 08/06/2022    LDL 93 05/15/2020     No results found for: CHOL  Lab Results   Component Value Date    TRIG 97 03/17/2023    TRIG 124 08/06/2022    TRIG 119 05/15/2020     Lab Results   Component Value Date    HDL 59 03/17/2023    HDL 51 08/06/2022    HDL 54 05/15/2020     No results found for: PSA  Lab Results   Component Value Date    WBC 7.1 03/17/2023    HGB 14.1 03/17/2023    HCT 43.4 03/17/2023    MCV 86 03/17/2023     03/17/2023     Lab Results   Component Value Date    TSH 2.910 03/17/2023     Lab Results   Component Value Date    GLUCOSE 108 (H) 03/17/2023    BUN 15 03/17/2023    CREATININE 0.87 03/17/2023    EGFRIFNONA 72 10/18/2020    EGFRIFAFRI 72 05/15/2020    BCR 17 03/17/2023    K 4.8 03/17/2023    CO2 25 03/17/2023    CALCIUM 9.7 03/17/2023    PROTENTOTREF 7.3 03/17/2023    ALBUMIN 4.5 03/17/2023    LABIL2 1.6 03/17/2023    AST  24 03/17/2023    ALT 24 03/17/2023     No results found for: VOLODYMYR, RF, SEDRATE   No results found for: CRP   No results found for: IRON, TIBC, FERRITIN   Lab Results   Component Value Date    [id number] 08/06/2022        Age-appropriate Screening Schedule:  Refer to the list below for future screening recommendations based on patient's age, sex and/or medical conditions. Orders for these recommended tests are listed in the plan section. The patient has been provided with a written plan.    Health Maintenance   Topic Date Due   • COVID-19 Vaccine (1) Never done   • ZOSTER VACCINE (1 of 2) Never done   • TDAP/TD VACCINES (3 - Td or Tdap) 10/22/2019   • HEPATITIS C SCREENING  Never done   • PAP SMEAR  Never done   • LUNG CANCER SCREENING  09/04/2021   • INFLUENZA VACCINE  08/01/2023   • MAMMOGRAM  02/18/2024   • ANNUAL PHYSICAL  03/17/2024   • LIPID PANEL  03/17/2024   • COLORECTAL CANCER SCREENING  04/02/2028   • Pneumococcal Vaccine 0-64  Aged Out           Assessment & Plan      Medications        Problem List         LOS      Medicare wellness.   Doing well, vaccines current.  Coated baby aspirin daily.  Discussed calcium and vitamin D.  Discussed health maintenance, screening test, lifestyle modification.  Followed by OB/GYN Dr. Shook mammogram current.  History of tobacco use.  Significant history, has quit in 2013.. CT scan lung cancer screening benign 2020.  EKG benign 2020.  Fatigue.  Replacing vitamin D, recheck levels.  Start regular exercise program.  Weight gain.  Improved today, has lost weight.  Following quitting smoking.  Discussed diet, exercise, lifestyle modification.  History of intolerance to Wellbutrin, not tolerating Topamax.  Follow-up recheck.  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  Add melatonin  Colon screening.  Colonoscopy 2017 with diverticulosis only.  COVID-19 viral infection.    Improved/resolved.  October/20.  Doing well currently, resolved.  Call return if  recurrent symptoms.  Has been vaccinated/recommend booster.  Low back pain.  OA hips contributing.  Ice, NSAIDs, rehabilitation exercise discussed.  Consider imaging, PT referral if persistent symptoms.  Acute bacterial sinusitis.  Improved today, has completed course antibiotics.  He does still have stitches that  Allergic rhinitis.  OTC Claritin or Zyrtec.  Cold sores.  Start as needed acyclovir.  Lateral epicondylitis.    Improved today status post injection.  Attempting counterforce strap through Worker's Comp..  Call or return if persistent symptoms.  Plantar faaciitis.  Bilateral, she is working long shifts on concrete start prednisone, heel cups, nighttime bracing.  Ice, rehabilitation exercise discussed.  Consider imaging, podiatry referral if persistent symptoms.      Diagnoses and all orders for this visit:    1. Annual visit for general adult medical examination with abnormal findings (Primary)  -     POCT urinalysis dipstick, manual    2. Other hyperlipidemia  -     Lipid Panel With / Chol / HDL Ratio    3. Earache    4. Bilateral foot pain  -     predniSONE (DELTASONE) 5 MG tablet; 40mg x 3 days, 20mg x 3 days, 10mg x 3 days, 5mg x 3 days  Dispense: 45 tablet; Refill: 0    5. History of tobacco use    6. Class 1 obesity due to excess calories without serious comorbidity with body mass index (BMI) of 30.0 to 30.9 in adult  Assessment & Plan:  Patient's (Body mass index is 30.28 kg/m².) indicates that they are obese (BMI >30) with health conditions that include dyslipidemias . Weight is unchanged. BMI  is above average; BMI management plan is completed. We discussed portion control and increasing exercise.       7. Screening for malignant neoplasm of colon    8. Screening for malignant neoplasm of cervix    9. Encounter for screening mammogram for malignant neoplasm of breast    10. Other fatigue  -     CBC & Differential  -     Comprehensive Metabolic Panel  -     TSH    11. Vitamin D deficiency  -      Vitamin D,25-Hydroxy    12. Seasonal allergic rhinitis due to other allergic trigger  -     loratadine (Claritin) 10 MG tablet; Take 1 tablet by mouth Daily.  Dispense: 30 tablet; Refill: 5    13. Plantar fasciitis, bilateral  -     traMADol (ULTRAM) 50 MG tablet; Take 1 tablet by mouth Every 6 (Six) Hours As Needed for Moderate Pain.  Dispense: 60 tablet; Refill: 0    14. Plantar fasciitis            Expected course, medications, and adverse effects discussed.  Call or return if worsening or persistent symptoms.  I wore protective equipment throughout this patient encounter including a mask, gloves, and eye protection.  Hand hygiene was performed before donning protective equipment and after removal when leaving the room. The complete contents of the Assessment and Plan and Data / Lab Results as documented above have been reviewed and addressed by myself with the patient today as part of an ongoing evaluation / treatment plan.  If some of the documentation has been copied from a previous note and is unchanged it indicates that this problem / plan has been assessed today but is stable from a previous visit and no changes have been recommended.  The separate E/M service provided today is significant, medically necessary, and separately identifiable.

## 2023-03-17 ENCOUNTER — OFFICE VISIT (OUTPATIENT)
Dept: FAMILY MEDICINE CLINIC | Facility: CLINIC | Age: 63
End: 2023-03-17
Payer: COMMERCIAL

## 2023-03-17 VITALS
HEIGHT: 66 IN | DIASTOLIC BLOOD PRESSURE: 80 MMHG | SYSTOLIC BLOOD PRESSURE: 130 MMHG | RESPIRATION RATE: 16 BRPM | HEART RATE: 82 BPM | TEMPERATURE: 97.3 F | WEIGHT: 187.6 LBS | BODY MASS INDEX: 30.15 KG/M2 | OXYGEN SATURATION: 97 %

## 2023-03-17 DIAGNOSIS — M79.671 BILATERAL FOOT PAIN: ICD-10-CM

## 2023-03-17 DIAGNOSIS — Z12.4 SCREENING FOR MALIGNANT NEOPLASM OF CERVIX: ICD-10-CM

## 2023-03-17 DIAGNOSIS — M79.672 BILATERAL FOOT PAIN: ICD-10-CM

## 2023-03-17 DIAGNOSIS — R53.83 OTHER FATIGUE: ICD-10-CM

## 2023-03-17 DIAGNOSIS — H92.09 EARACHE: ICD-10-CM

## 2023-03-17 DIAGNOSIS — M72.2 PLANTAR FASCIITIS, BILATERAL: ICD-10-CM

## 2023-03-17 DIAGNOSIS — E78.49 OTHER HYPERLIPIDEMIA: ICD-10-CM

## 2023-03-17 DIAGNOSIS — Z12.11 SCREENING FOR MALIGNANT NEOPLASM OF COLON: ICD-10-CM

## 2023-03-17 DIAGNOSIS — J30.89 SEASONAL ALLERGIC RHINITIS DUE TO OTHER ALLERGIC TRIGGER: ICD-10-CM

## 2023-03-17 DIAGNOSIS — E66.09 CLASS 1 OBESITY DUE TO EXCESS CALORIES WITHOUT SERIOUS COMORBIDITY WITH BODY MASS INDEX (BMI) OF 30.0 TO 30.9 IN ADULT: ICD-10-CM

## 2023-03-17 DIAGNOSIS — Z12.31 ENCOUNTER FOR SCREENING MAMMOGRAM FOR MALIGNANT NEOPLASM OF BREAST: ICD-10-CM

## 2023-03-17 DIAGNOSIS — E55.9 VITAMIN D DEFICIENCY: ICD-10-CM

## 2023-03-17 DIAGNOSIS — Z00.01 ANNUAL VISIT FOR GENERAL ADULT MEDICAL EXAMINATION WITH ABNORMAL FINDINGS: Primary | ICD-10-CM

## 2023-03-17 DIAGNOSIS — Z87.891 HISTORY OF TOBACCO USE: ICD-10-CM

## 2023-03-17 DIAGNOSIS — M72.2 PLANTAR FASCIITIS: ICD-10-CM

## 2023-03-17 LAB
BILIRUB BLD-MCNC: NEGATIVE MG/DL
CLARITY, POC: CLEAR
COLOR UR: YELLOW
GLUCOSE UR STRIP-MCNC: NEGATIVE MG/DL
KETONES UR QL: NEGATIVE
LEUKOCYTE EST, POC: NEGATIVE
NITRITE UR-MCNC: NEGATIVE MG/ML
PH UR: 6 [PH] (ref 5–8)
PROT UR STRIP-MCNC: ABNORMAL MG/DL
RBC # UR STRIP: NEGATIVE /UL
SP GR UR: 1.01 (ref 1–1.03)
UROBILINOGEN UR QL: NORMAL

## 2023-03-17 RX ORDER — DIPHENOXYLATE HYDROCHLORIDE AND ATROPINE SULFATE 2.5; .025 MG/1; MG/1
TABLET ORAL DAILY
COMMUNITY

## 2023-03-17 RX ORDER — LORATADINE 10 MG/1
10 TABLET ORAL DAILY
Qty: 30 TABLET | Refills: 5 | Status: SHIPPED | OUTPATIENT
Start: 2023-03-17 | End: 2023-04-10

## 2023-03-17 RX ORDER — TRAMADOL HYDROCHLORIDE 50 MG/1
50 TABLET ORAL EVERY 6 HOURS PRN
Qty: 60 TABLET | Refills: 0 | Status: SHIPPED | OUTPATIENT
Start: 2023-03-17

## 2023-03-17 RX ORDER — PREDNISONE 1 MG/1
TABLET ORAL
Qty: 45 TABLET | Refills: 0 | Status: SHIPPED | OUTPATIENT
Start: 2023-03-17

## 2023-03-17 NOTE — ASSESSMENT & PLAN NOTE
Patient's (Body mass index is 30.28 kg/m².) indicates that they are obese (BMI >30) with health conditions that include dyslipidemias . Weight is unchanged. BMI  is above average; BMI management plan is completed. We discussed portion control and increasing exercise.

## 2023-03-18 LAB
25(OH)D3+25(OH)D2 SERPL-MCNC: 43 NG/ML (ref 30–100)
ALBUMIN SERPL-MCNC: 4.5 G/DL (ref 3.8–4.9)
ALBUMIN/GLOB SERPL: 1.6 {RATIO} (ref 1.2–2.2)
ALP SERPL-CCNC: 156 IU/L (ref 44–121)
ALT SERPL-CCNC: 24 IU/L (ref 0–32)
AST SERPL-CCNC: 24 IU/L (ref 0–40)
BASOPHILS # BLD AUTO: 0.1 X10E3/UL (ref 0–0.2)
BASOPHILS NFR BLD AUTO: 1 %
BILIRUB SERPL-MCNC: 0.3 MG/DL (ref 0–1.2)
BUN SERPL-MCNC: 15 MG/DL (ref 8–27)
BUN/CREAT SERPL: 17 (ref 12–28)
CALCIUM SERPL-MCNC: 9.7 MG/DL (ref 8.7–10.3)
CHLORIDE SERPL-SCNC: 102 MMOL/L (ref 96–106)
CHOLEST SERPL-MCNC: 190 MG/DL (ref 100–199)
CHOLEST/HDLC SERPL: 3.2 RATIO (ref 0–4.4)
CO2 SERPL-SCNC: 25 MMOL/L (ref 20–29)
CREAT SERPL-MCNC: 0.87 MG/DL (ref 0.57–1)
EGFRCR SERPLBLD CKD-EPI 2021: 76 ML/MIN/1.73
EOSINOPHIL # BLD AUTO: 0.2 X10E3/UL (ref 0–0.4)
EOSINOPHIL NFR BLD AUTO: 2 %
ERYTHROCYTE [DISTWIDTH] IN BLOOD BY AUTOMATED COUNT: 12.6 % (ref 11.7–15.4)
GLOBULIN SER CALC-MCNC: 2.8 G/DL (ref 1.5–4.5)
GLUCOSE SERPL-MCNC: 108 MG/DL (ref 70–99)
HCT VFR BLD AUTO: 43.4 % (ref 34–46.6)
HDLC SERPL-MCNC: 59 MG/DL
HGB BLD-MCNC: 14.1 G/DL (ref 11.1–15.9)
IMM GRANULOCYTES # BLD AUTO: 0 X10E3/UL (ref 0–0.1)
IMM GRANULOCYTES NFR BLD AUTO: 0 %
LDLC SERPL CALC-MCNC: 114 MG/DL (ref 0–99)
LYMPHOCYTES # BLD AUTO: 2.2 X10E3/UL (ref 0.7–3.1)
LYMPHOCYTES NFR BLD AUTO: 31 %
MCH RBC QN AUTO: 27.8 PG (ref 26.6–33)
MCHC RBC AUTO-ENTMCNC: 32.5 G/DL (ref 31.5–35.7)
MCV RBC AUTO: 86 FL (ref 79–97)
MONOCYTES # BLD AUTO: 0.5 X10E3/UL (ref 0.1–0.9)
MONOCYTES NFR BLD AUTO: 7 %
NEUTROPHILS # BLD AUTO: 4.1 X10E3/UL (ref 1.4–7)
NEUTROPHILS NFR BLD AUTO: 59 %
PLATELET # BLD AUTO: 282 X10E3/UL (ref 150–450)
POTASSIUM SERPL-SCNC: 4.8 MMOL/L (ref 3.5–5.2)
PROT SERPL-MCNC: 7.3 G/DL (ref 6–8.5)
RBC # BLD AUTO: 5.07 X10E6/UL (ref 3.77–5.28)
SODIUM SERPL-SCNC: 142 MMOL/L (ref 134–144)
TRIGL SERPL-MCNC: 97 MG/DL (ref 0–149)
TSH SERPL DL<=0.005 MIU/L-ACNC: 2.91 UIU/ML (ref 0.45–4.5)
VLDLC SERPL CALC-MCNC: 17 MG/DL (ref 5–40)
WBC # BLD AUTO: 7.1 X10E3/UL (ref 3.4–10.8)

## 2023-04-01 PROBLEM — M72.2 PLANTAR FASCIITIS: Status: ACTIVE | Noted: 2023-04-01

## 2023-04-08 DIAGNOSIS — J30.89 SEASONAL ALLERGIC RHINITIS DUE TO OTHER ALLERGIC TRIGGER: ICD-10-CM

## 2023-04-09 DIAGNOSIS — G47.26 SHIFT WORK SLEEP DISORDER: ICD-10-CM

## 2023-04-10 RX ORDER — LORATADINE 10 MG/1
TABLET ORAL
Qty: 90 TABLET | Refills: 3 | Status: SHIPPED | OUTPATIENT
Start: 2023-04-10

## 2023-04-11 RX ORDER — MODAFINIL 100 MG/1
TABLET ORAL
Qty: 30 TABLET | Refills: 2 | Status: SHIPPED | OUTPATIENT
Start: 2023-04-11

## 2023-06-02 ENCOUNTER — OFFICE VISIT (OUTPATIENT)
Dept: FAMILY MEDICINE CLINIC | Facility: CLINIC | Age: 63
End: 2023-06-02

## 2023-06-02 VITALS
RESPIRATION RATE: 12 BRPM | BODY MASS INDEX: 29.47 KG/M2 | WEIGHT: 183.4 LBS | DIASTOLIC BLOOD PRESSURE: 84 MMHG | HEIGHT: 66 IN | TEMPERATURE: 98.2 F | OXYGEN SATURATION: 97 % | SYSTOLIC BLOOD PRESSURE: 122 MMHG | HEART RATE: 87 BPM

## 2023-06-02 DIAGNOSIS — E66.09 CLASS 1 OBESITY DUE TO EXCESS CALORIES WITHOUT SERIOUS COMORBIDITY WITH BODY MASS INDEX (BMI) OF 30.0 TO 30.9 IN ADULT: ICD-10-CM

## 2023-06-02 DIAGNOSIS — G47.26 SHIFT WORK SLEEP DISORDER: ICD-10-CM

## 2023-06-02 DIAGNOSIS — M79.672 BILATERAL FOOT PAIN: ICD-10-CM

## 2023-06-02 DIAGNOSIS — Z87.891 HISTORY OF TOBACCO USE: ICD-10-CM

## 2023-06-02 DIAGNOSIS — M72.2 PLANTAR FASCIITIS: Primary | ICD-10-CM

## 2023-06-02 DIAGNOSIS — M79.671 BILATERAL FOOT PAIN: ICD-10-CM

## 2023-06-02 DIAGNOSIS — M19.90 ARTHRITIS: ICD-10-CM

## 2023-06-02 RX ORDER — MODAFINIL 100 MG/1
100 TABLET ORAL DAILY
Qty: 30 TABLET | Refills: 2 | Status: SHIPPED | OUTPATIENT
Start: 2023-06-02

## 2023-06-02 RX ORDER — MODAFINIL 100 MG/1
100 TABLET ORAL DAILY
Qty: 30 TABLET | Refills: 2 | Status: SHIPPED | OUTPATIENT
Start: 2023-06-02 | End: 2023-06-02 | Stop reason: SDUPTHER

## 2023-06-02 RX ORDER — PREDNISONE 5 MG/1
TABLET ORAL
Qty: 45 TABLET | Refills: 0 | Status: SHIPPED | OUTPATIENT
Start: 2023-06-02

## 2023-06-02 NOTE — ASSESSMENT & PLAN NOTE
Patient's (Body mass index is 29.6 kg/m².) indicates that they are obese (BMI >30) with health conditions that include none . Weight is unchanged. BMI  is above average; BMI management plan is completed. We discussed portion control and increasing exercise.

## 2023-06-02 NOTE — PROGRESS NOTES
Subjective   Sariah Arnold is a 62 y.o. female.     Chief Complaint   Patient presents with    Foot Pain       Foot Pain  This is a chronic problem. The current episode started more than 1 year ago. The problem occurs constantly. The problem has been gradually worsening. Pertinent negatives include no abdominal pain, chest pain, chills, diaphoresis or nausea. Associated symptoms comments: Sariah stands on her feet constantly at work, and notices her feet have really been bothering her in the last 8 months. Constant pain. Currently, a 4/10 pain score but after work she states its a 10/10. The symptoms are aggravated by exertion, standing and walking. She has tried rest and NSAIDs for the symptoms.          I personally reviewed and updated the patient's allergies, medications, problem list, and past medical, surgical, social, and family history. I have reviewed and confirmed the accuracy of the History of Present Illness and Review of Symptoms as documented by the MA/LPN/RN. Ever Champagne MD    Family History   Problem Relation Age of Onset    Heart disease Mother     Hypertension Mother     Melanoma Sister     Thyroid cancer Sister     Diabetes Paternal Grandmother     Diabetes Paternal Grandfather     Cancer Half-Sister         thyroid cancer       Social History     Tobacco Use    Smoking status: Former     Packs/day: 1.50     Years: 35.00     Pack years: 52.50     Types: Cigarettes, Electronic Cigarette     Start date: 1/1/1975     Quit date: 1/1/2013     Years since quitting: 10.4    Smokeless tobacco: Never   Vaping Use    Vaping Use: Former    Start date: 1/1/2011    Quit date: 1/1/2013    Substances: Nicotine, Flavoring    Devices: Disposable   Substance Use Topics    Alcohol use: Yes    Drug use: Never       Past Surgical History:   Procedure Laterality Date    BREAST SURGERY  1999    Begin tumor    HYSTERECTOMY      TONSILLECTOMY  1994       Patient Active Problem List   Diagnosis    History of tobacco use     Other fatigue    Class 1 obesity due to excess calories without serious comorbidity with body mass index (BMI) of 30.0 to 30.9 in adult    Other headache syndrome    Arthritis    Annual visit for general adult medical examination with abnormal findings    Screening for malignant neoplasm of colon    Screening for malignant neoplasm of cervix    Encounter for screening for malignant neoplasm of breast    Shift work sleep disorder    Herpes    Recurrent cold sores    Elbow injury, right, initial encounter    Other hyperlipidemia    Lateral epicondylitis of right elbow    Plantar fasciitis         Current Outpatient Medications:     acyclovir (ZOVIRAX) 400 MG tablet, Take 1 tablet by mouth 5 (Five) Times a Day., Disp: 25 tablet, Rfl: 11    acyclovir (ZOVIRAX) 5 % ointment, Apply 1 application topically to the appropriate area as directed 2 (Two) Times a Day As Needed (cold sores). Begin treatment at earliest sign or symptom., Disp: 30 g, Rfl: 11    loratadine (CLARITIN) 10 MG tablet, TAKE 1 TABLET BY MOUTH EVERY DAY, Disp: 90 tablet, Rfl: 3    modafinil (PROVIGIL) 100 MG tablet, Take 1 tablet by mouth Daily., Disp: 30 tablet, Rfl: 2    multivitamin (THERAGRAN) tablet tablet, Take  by mouth Daily., Disp: , Rfl:     neomycin-polymyxin-hydrocortisone (CORTISPORIN) 3.5-44347-3 otic suspension, Administer 3 drops into ear(s) as directed by provider 2 (Two) Times a Day., Disp: 10 mL, Rfl: 2    predniSONE (DELTASONE) 5 MG tablet, 40mg x 3 days, 20mg x 3 days, 10mg x 3 days, 5mg x 3 days, Disp: 45 tablet, Rfl: 0    traMADol (ULTRAM) 50 MG tablet, Take 1 tablet by mouth Every 6 (Six) Hours As Needed for Moderate Pain., Disp: 60 tablet, Rfl: 0    valACYclovir (VALTREX) 1000 MG tablet, valacyclovir 1 gram tablet, Disp: , Rfl:     Semaglutide-Weight Management 0.25 MG/0.5ML solution auto-injector, Inject 0.25 mg under the skin into the appropriate area as directed 1 (One) Time Per Week., Disp: 2 mL, Rfl: 3         Review of  "Systems   Constitutional:  Negative for chills and diaphoresis.   HENT:  Positive for ear pain.    Eyes:  Negative for visual disturbance.   Respiratory:  Negative for shortness of breath.    Cardiovascular:  Negative for chest pain and palpitations.   Gastrointestinal:  Negative for abdominal pain and nausea.   Endocrine: Negative for polydipsia and polyphagia.   Musculoskeletal:  Negative for neck stiffness.   Skin:  Negative for color change and pallor.   Neurological:  Negative for seizures and syncope.   Hematological:  Negative for adenopathy.   Psychiatric/Behavioral:  Negative for hallucinations and suicidal ideas.      I have reviewed and confirmed the accuracy of the ROS as documented by the MA/LPN/RN Ever Champagne MD      Objective   /84 (BP Location: Left arm, Patient Position: Sitting, Cuff Size: Adult)   Pulse 87   Temp 98.2 °F (36.8 °C) (Infrared)   Resp 12   Ht 167.6 cm (66\")   Wt 83.2 kg (183 lb 6.4 oz)   SpO2 97%   BMI 29.60 kg/m²   BP Readings from Last 3 Encounters:   06/02/23 122/84   03/17/23 130/80   08/12/22 120/78     Wt Readings from Last 3 Encounters:   06/02/23 83.2 kg (183 lb 6.4 oz)   03/17/23 85.1 kg (187 lb 9.6 oz)   08/12/22 84.9 kg (187 lb 4 oz)     Physical Exam  Constitutional:       Appearance: Normal appearance. She is well-developed. She is not diaphoretic.   Cardiovascular:      Rate and Rhythm: Normal rate and regular rhythm.      Pulses: Normal pulses.      Heart sounds: Normal heart sounds, S1 normal and S2 normal. No murmur heard.    No friction rub. No gallop.   Pulmonary:      Effort: Pulmonary effort is normal. No accessory muscle usage.      Breath sounds: Normal breath sounds. No stridor. No decreased breath sounds, wheezing, rhonchi or rales.   Abdominal:      General: Bowel sounds are normal. There is no distension.      Palpations: Abdomen is soft. Abdomen is not rigid. There is no mass or pulsatile mass.      Tenderness: There is no abdominal " tenderness. There is no guarding or rebound. Negative signs include Balderas's sign.      Hernia: No hernia is present.   Musculoskeletal:      Right ankle: Normal. No swelling, deformity or ecchymosis. No tenderness. No lateral malleolus, medial malleolus, AITF ligament, CF ligament, posterior TF ligament or proximal fibula tenderness. Normal range of motion.      Right Achilles Tendon: Normal. Vela's test negative.      Left ankle: Normal. No swelling, deformity or ecchymosis. No tenderness. No lateral malleolus, medial malleolus, AITF ligament, CF ligament, posterior TF ligament or proximal fibula tenderness. Normal range of motion. Normal pulse.      Left Achilles Tendon: Normal. Vela's test negative.      Right foot: Normal. Normal range of motion. No swelling, deformity, tenderness or crepitus.      Left foot: Normal. Normal range of motion. No swelling, deformity, tenderness or crepitus.   Skin:     General: Skin is warm and dry.      Coloration: Skin is not pale.   Neurological:      Mental Status: She is alert and oriented to person, place, and time.      Coordination: Coordination normal.      Gait: Gait normal.       Data / Lab Results:    No results found for: HGBA1C     Lab Results   Component Value Date     (H) 03/17/2023     (H) 08/06/2022    LDL 93 05/15/2020     No results found for: CHOL  Lab Results   Component Value Date    TRIG 97 03/17/2023    TRIG 124 08/06/2022    TRIG 119 05/15/2020     Lab Results   Component Value Date    HDL 59 03/17/2023    HDL 51 08/06/2022    HDL 54 05/15/2020     No results found for: PSA  Lab Results   Component Value Date    WBC 7.1 03/17/2023    HGB 14.1 03/17/2023    HCT 43.4 03/17/2023    MCV 86 03/17/2023     03/17/2023     Lab Results   Component Value Date    TSH 2.910 03/17/2023      Lab Results   Component Value Date    GLUCOSE 108 (H) 03/17/2023    BUN 15 03/17/2023    CREATININE 0.87 03/17/2023    EGFRIFNONA 72 10/18/2020     EGFRIFAFRI 72 05/15/2020    BCR 17 03/17/2023    K 4.8 03/17/2023    CO2 25 03/17/2023    CALCIUM 9.7 03/17/2023    PROTENTOTREF 7.3 03/17/2023    ALBUMIN 4.5 03/17/2023    LABIL2 1.6 03/17/2023    AST 24 03/17/2023    ALT 24 03/17/2023     No results found for: VOLODYMYR, RF, SEDRATE   No results found for: CRP   No results found for: IRON, TIBC, FERRITIN   Lab Results   Component Value Date    [id number] 787 08/06/2022          Assessment & Plan      Medications        Problem List         LOS       Health maintenance Doing well, vaccines current.  Coated baby aspirin daily.  Discussed calcium and vitamin D.  Discussed health maintenance, screening test, lifestyle modification.  Followed by OB/GYN Dr. Shook mammogram current.  History of tobacco use.  Significant history, has quit in 2013.. CT scan lung cancer screening benign 2020.  EKG benign 2020.  Fatigue.  Replacing vitamin D, recheck levels.  Start regular exercise program.  Weight gain.  Improved today, has lost weight.  Following quitting smoking.  Discussed diet, exercise, lifestyle modification.  History of intolerance to Wellbutrin, not tolerating Topamax.  Follow-up recheck.  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  Add melatonin  Colon screening.  Colonoscopy 2017 with diverticulosis only.  COVID-19 viral infection.    Improved/resolved.  October/20.  Doing well currently, resolved.  Call return if recurrent symptoms.  Has been vaccinated/recommend booster.  Low back pain.  OA hips contributing.  Ice, NSAIDs, rehabilitation exercise discussed.  Consider imaging, PT referral if persistent symptoms.  Acute bacterial sinusitis.  Improved today, has completed course antibiotics.  He does still have stitches that  Allergic rhinitis.  OTC Claritin or Zyrtec.  Cold sores.  Start as needed acyclovir.  Lateral epicondylitis.    Improved today status post injection.  Attempting counterforce strap through Worker's Comp..  Call or return if  persistent symptoms.  Plantar faaciitis.  Bilateral, she is working long shifts on concrete start prednisone, heel cups, nighttime bracing.  Ice, rehabilitation exercise discussed.  Consider imaging, podiatry referral if persistent symptoms.           Diagnoses and all orders for this visit:    1. Plantar fasciitis (Primary)  -     Ambulatory Referral to Podiatry    2. History of tobacco use    3. Class 1 obesity due to excess calories without serious comorbidity with body mass index (BMI) of 30.0 to 30.9 in adult  Assessment & Plan:  Patient's (Body mass index is 29.6 kg/m².) indicates that they are obese (BMI >30) with health conditions that include none . Weight is unchanged. BMI  is above average; BMI management plan is completed. We discussed portion control and increasing exercise.     Orders:  -     Semaglutide-Weight Management 0.25 MG/0.5ML solution auto-injector; Inject 0.25 mg under the skin into the appropriate area as directed 1 (One) Time Per Week.  Dispense: 2 mL; Refill: 3    4. Shift work sleep disorder  -     Discontinue: modafinil (PROVIGIL) 100 MG tablet; Take 1 tablet by mouth Daily.  Dispense: 30 tablet; Refill: 2  -     modafinil (PROVIGIL) 100 MG tablet; Take 1 tablet by mouth Daily.  Dispense: 30 tablet; Refill: 2    5. Bilateral foot pain  -     predniSONE (DELTASONE) 5 MG tablet; 40mg x 3 days, 20mg x 3 days, 10mg x 3 days, 5mg x 3 days  Dispense: 45 tablet; Refill: 0  -     Ambulatory Referral to Podiatry  -     XR Foot 3+ View Bilateral    6. Arthritis              Expected course, medications, and adverse effects discussed.  Call or return if worsening or persistent symptoms.  I wore protective equipment throughout this patient encounter including a mask, gloves, and eye protection.  Hand hygiene was performed before donning protective equipment and after removal when leaving the room. The complete contents of the Assessment and Plan and Data/Lab Results as documented above have been  reviewed and addressed by myself with the patient today as part of an ongoing evaluation / treatment plan.  If some of the documentation has been copied from a previous note and is unchanged it indicates that this problem / plan has been assessed today but is stable from a previous visit and no changes have been recommended.

## 2023-06-03 DIAGNOSIS — M72.2 PLANTAR FASCIITIS, BILATERAL: ICD-10-CM

## 2023-06-05 ENCOUNTER — TELEPHONE (OUTPATIENT)
Dept: FAMILY MEDICINE CLINIC | Facility: CLINIC | Age: 63
End: 2023-06-05
Payer: COMMERCIAL

## 2023-06-05 RX ORDER — TRAMADOL HYDROCHLORIDE 50 MG/1
TABLET ORAL
Qty: 60 TABLET | Refills: 0 | Status: SHIPPED | OUTPATIENT
Start: 2023-06-05

## 2023-08-15 DIAGNOSIS — M72.2 PLANTAR FASCIITIS, BILATERAL: ICD-10-CM

## 2023-08-16 RX ORDER — TRAMADOL HYDROCHLORIDE 50 MG/1
TABLET ORAL
Qty: 60 TABLET | Refills: 0 | Status: SHIPPED | OUTPATIENT
Start: 2023-08-16

## 2023-10-06 NOTE — PROGRESS NOTES
Subjective   Sariah Arnold is a 62 y.o. female.     Chief Complaint   Patient presents with    Foot Pain    Hyperlipidemia       Foot Pain  This is a chronic problem. The current episode started more than 1 year ago. The problem occurs constantly. The problem has been gradually worsening. Associated symptoms include joint swelling and weakness. Pertinent negatives include no abdominal pain, chest pain, chills, diaphoresis or nausea. Associated symptoms comments: Sariah stands on her feet constantly at work, and notices her feet have really been bothering her in the last 8 months. Constant pain. Currently, a 4/10 pain score but after work she states its a 10/10.     10/9- She states her feet are doing well. She has been doing injections and states they are more of a numb feeling now. Doing better than before. The symptoms are aggravated by exertion, standing and walking. She has tried rest and NSAIDs (injections) for the symptoms.   Hyperlipidemia  This is a new problem. The current episode started more than 1 month ago. Exacerbating diseases include obesity. There are no known factors aggravating her hyperlipidemia. Associated symptoms include leg pain. Pertinent negatives include no chest pain or shortness of breath. She is currently on no antihyperlipidemic treatment. There are no compliance problems.  Risk factors for coronary artery disease include obesity.            I personally reviewed and updated the patient's allergies, medications, problem list, and past medical, surgical, social, and family history. I have reviewed and confirmed the accuracy of the History of Present Illness and Review of Symptoms as documented by the MA/LAITH/RN. Ever Champagne MD    Family History   Problem Relation Age of Onset    Heart disease Mother     Hypertension Mother     Melanoma Sister     Thyroid cancer Sister     Diabetes Paternal Grandmother     Diabetes Paternal Grandfather     Cancer Half-Sister         thyroid cancer        Social History     Tobacco Use    Smoking status: Former     Packs/day: 1.50     Years: 35.00     Additional pack years: 0.00     Total pack years: 52.50     Types: Cigarettes, Electronic Cigarette     Start date: 1/1/1975     Quit date: 1/1/2013     Years since quitting: 10.7    Smokeless tobacco: Never   Vaping Use    Vaping Use: Former    Start date: 1/1/2011    Quit date: 1/1/2013    Substances: Nicotine, Flavoring    Devices: Disposable   Substance Use Topics    Alcohol use: Yes    Drug use: Never       Past Surgical History:   Procedure Laterality Date    BREAST SURGERY  1999    Begin tumor    HYSTERECTOMY      TONSILLECTOMY  1994       Patient Active Problem List   Diagnosis    History of tobacco use    Other fatigue    Overweight with body mass index (BMI) of 29 to 29.9 in adult    Other headache syndrome    Arthritis    Annual visit for general adult medical examination with abnormal findings    Screening for malignant neoplasm of colon    Screening for malignant neoplasm of cervix    Encounter for screening for malignant neoplasm of breast    Shift work sleep disorder    Herpes    Recurrent cold sores    Elbow injury, right, initial encounter    Other hyperlipidemia    Lateral epicondylitis of right elbow    Plantar fasciitis         Current Outpatient Medications:     acyclovir (ZOVIRAX) 400 MG tablet, Take 1 tablet by mouth 5 (Five) Times a Day., Disp: 25 tablet, Rfl: 11    acyclovir (ZOVIRAX) 5 % ointment, Apply 1 application topically to the appropriate area as directed 2 (Two) Times a Day As Needed (cold sores). Begin treatment at earliest sign or symptom., Disp: 30 g, Rfl: 11    loratadine (CLARITIN) 10 MG tablet, TAKE 1 TABLET BY MOUTH EVERY DAY, Disp: 90 tablet, Rfl: 3    modafinil (PROVIGIL) 100 MG tablet, Take 1 tablet by mouth Daily., Disp: 30 tablet, Rfl: 2    multivitamin (THERAGRAN) tablet tablet, Take  by mouth Daily., Disp: , Rfl:     traMADol (ULTRAM) 50 MG tablet, Take 1 tablet  "twice daily as needed, sparingly, Disp: 60 tablet, Rfl: 0    neomycin-polymyxin-hydrocortisone (CORTISPORIN) 3.5-54075-4 otic suspension, Administer 3 drops into ear(s) as directed by provider 2 (Two) Times a Day. (Patient not taking: Reported on 10/9/2023), Disp: 10 mL, Rfl: 2    Semaglutide-Weight Management 0.25 MG/0.5ML solution auto-injector, Inject 0.25 mg under the skin into the appropriate area as directed 1 (One) Time Per Week. (Patient not taking: Reported on 10/9/2023), Disp: 2 mL, Rfl: 3    valACYclovir (VALTREX) 1000 MG tablet, valacyclovir 1 gram tablet (Patient not taking: Reported on 10/9/2023), Disp: , Rfl:          Review of Systems   Constitutional:  Negative for chills and diaphoresis.   Eyes:  Negative for visual disturbance.   Respiratory:  Negative for shortness of breath.    Cardiovascular:  Negative for chest pain and palpitations.   Gastrointestinal:  Negative for abdominal pain and nausea.   Endocrine: Negative for polydipsia and polyphagia.   Musculoskeletal:  Positive for joint swelling. Negative for neck stiffness.   Skin:  Negative for color change and pallor.   Neurological:  Positive for weakness. Negative for seizures and syncope.   Hematological:  Negative for adenopathy.       I have reviewed and confirmed the accuracy of the ROS as documented by the MA/LPN/RN Ever Champagne MD      Objective   /72 (BP Location: Right arm, Patient Position: Sitting, Cuff Size: Adult)   Pulse 119   Temp 97.8 øF (36.6 øC) (Temporal)   Resp 18   Ht 167.6 cm (66\")   Wt 83.1 kg (183 lb 3.2 oz)   SpO2 95%   BMI 29.57 kg/mý   BP Readings from Last 3 Encounters:   10/09/23 128/72   06/02/23 122/84   03/17/23 130/80     Wt Readings from Last 3 Encounters:   10/09/23 83.1 kg (183 lb 3.2 oz)   06/02/23 83.2 kg (183 lb 6.4 oz)   03/17/23 85.1 kg (187 lb 9.6 oz)     Physical Exam  Constitutional:       Appearance: Normal appearance. She is well-developed. She is not diaphoretic.   Cardiovascular: " "     Rate and Rhythm: Normal rate and regular rhythm.      Pulses: Normal pulses.      Heart sounds: Normal heart sounds, S1 normal and S2 normal. No murmur heard.     No friction rub. No gallop.   Pulmonary:      Effort: Pulmonary effort is normal. No accessory muscle usage.      Breath sounds: Normal breath sounds. No stridor. No decreased breath sounds, wheezing, rhonchi or rales.   Abdominal:      General: Bowel sounds are normal. There is no distension.      Palpations: Abdomen is soft. Abdomen is not rigid. There is no mass or pulsatile mass.      Tenderness: There is no abdominal tenderness. There is no guarding or rebound. Negative signs include Balderas's sign.      Hernia: No hernia is present.   Skin:     General: Skin is warm and dry.      Coloration: Skin is not pale.   Neurological:      Mental Status: She is alert and oriented to person, place, and time.      Coordination: Coordination normal.      Gait: Gait normal.         Data / Lab Results:    No results found for: \"HGBA1C\"     Lab Results   Component Value Date     (H) 03/17/2023     (H) 08/06/2022    LDL 93 05/15/2020     No results found for: \"CHOL\"  Lab Results   Component Value Date    TRIG 97 03/17/2023    TRIG 124 08/06/2022    TRIG 119 05/15/2020     Lab Results   Component Value Date    HDL 59 03/17/2023    HDL 51 08/06/2022    HDL 54 05/15/2020     No results found for: \"PSA\"  Lab Results   Component Value Date    WBC 7.1 03/17/2023    HGB 14.1 03/17/2023    HCT 43.4 03/17/2023    MCV 86 03/17/2023     03/17/2023     Lab Results   Component Value Date    TSH 2.910 03/17/2023      Lab Results   Component Value Date    GLUCOSE 108 (H) 03/17/2023    BUN 15 03/17/2023    CREATININE 0.87 03/17/2023    EGFRIFNONA 72 10/18/2020    EGFRIFAFRI 72 05/15/2020    BCR 17 03/17/2023    K 4.8 03/17/2023    CO2 25 03/17/2023    CALCIUM 9.7 03/17/2023    PROTENTOTREF 7.3 03/17/2023    ALBUMIN 4.5 03/17/2023    LABIL2 1.6 03/17/2023    " "AST 24 03/17/2023    ALT 24 03/17/2023     No results found for: \"VOLODYMYR\", \"RF\", \"SEDRATE\"   No results found for: \"CRP\"   No results found for: \"IRON\", \"TIBC\", \"FERRITIN\"   Lab Results   Component Value Date    [id number] 787 08/06/2022          Assessment & Plan      Medications        Problem List         LOS      Health maintenance Doing well, vaccines current.  Coated baby aspirin daily.  Discussed calcium and vitamin D.  Discussed health maintenance, screening test, lifestyle modification.  Followed by OB/GYN Dr. Shook mammogram current.  History of tobacco use.  Significant history, has quit in 2013.. CT scan lung cancer screening benign 2020.  EKG benign 2020.  Fatigue.  Replacing vitamin D, recheck levels.  Start regular exercise program.  Weight gain.  Improved today, has lost weight.  Following quitting smoking.  Discussed diet, exercise, lifestyle modification.  History of intolerance to Wellbutrin, not tolerating Topamax.  Follow-up recheck.  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  Add melatonin  Colon screening.  Colonoscopy 2017 with diverticulosis only.  COVID-19 viral infection.  Recurrent episode, improved/resolved currently, has completed course Paxlovid.  Improved/resolved.  October/20.  Doing well currently, resolved.  Call return if recurrent symptoms.  Has been vaccinated/recommend booster.  Low back pain.  OA hips contributing.  Ice, NSAIDs, rehabilitation exercise discussed.  Consider imaging, PT referral if persistent symptoms.  Acute bacterial sinusitis.  Improved today, has completed course antibiotics.  He does still have stitches that  Allergic rhinitis.  OTC Claritin or Zyrtec.  Cold sores.  Start as needed acyclovir.  Lateral epicondylitis.    Improved today status post injection.  Attempting counterforce strap through Worker's Comp..  Call or return if persistent symptoms.  Plantar faaciitis.  improved today status post bilateral injection, followed by " podiatry.Bilateral, she is working long shifts on concrete continue heel cups, nighttime bracing.  Ice, rehabilitation exercise discussed    BMI is >= 25 and <30. (Overweight) The following options were offered after discussion;: referral to primary care       Diagnoses and all orders for this visit:    1. Plantar fasciitis (Primary)    2. Other hyperlipidemia    3. History of tobacco use    4. Overweight with body mass index (BMI) of 29 to 29.9 in adult  Assessment & Plan:  Patient's (Body mass index is 29.57 kg/mý.) indicates that they are obese (BMI >30) with health conditions that include none . Weight is unchanged. BMI  is above average; BMI management plan is completed. We discussed portion control and increasing exercise.       5. Need for influenza vaccination  -     Fluzone (or Fluarix & Flulaval for VFC) >6mos    6. Plantar fasciitis, bilateral  -     traMADol (ULTRAM) 50 MG tablet; Take 1 tablet twice daily as needed, sparingly  Dispense: 60 tablet; Refill: 0    7. Shift work sleep disorder  -     modafinil (PROVIGIL) 100 MG tablet; Take 1 tablet by mouth Daily.  Dispense: 30 tablet; Refill: 2              Expected course, medications, and adverse effects discussed.  Call or return if worsening or persistent symptoms.  I wore protective equipment throughout this patient encounter including a mask, gloves, and eye protection.  Hand hygiene was performed before donning protective equipment and after removal when leaving the room. The complete contents of the Assessment and Plan and Data/Lab Results as documented above have been reviewed and addressed by myself with the patient today as part of an ongoing evaluation / treatment plan.  If some of the documentation has been copied from a previous note and is unchanged it indicates that this problem / plan has been assessed today but is stable from a previous visit and no changes have been recommended.

## 2023-10-09 ENCOUNTER — OFFICE VISIT (OUTPATIENT)
Dept: FAMILY MEDICINE CLINIC | Facility: CLINIC | Age: 63
End: 2023-10-09
Payer: COMMERCIAL

## 2023-10-09 VITALS
WEIGHT: 183.2 LBS | HEIGHT: 66 IN | SYSTOLIC BLOOD PRESSURE: 128 MMHG | RESPIRATION RATE: 18 BRPM | DIASTOLIC BLOOD PRESSURE: 72 MMHG | OXYGEN SATURATION: 95 % | HEART RATE: 119 BPM | TEMPERATURE: 97.8 F | BODY MASS INDEX: 29.44 KG/M2

## 2023-10-09 DIAGNOSIS — E66.3 OVERWEIGHT WITH BODY MASS INDEX (BMI) OF 29 TO 29.9 IN ADULT: ICD-10-CM

## 2023-10-09 DIAGNOSIS — M72.2 PLANTAR FASCIITIS, BILATERAL: ICD-10-CM

## 2023-10-09 DIAGNOSIS — M72.2 PLANTAR FASCIITIS: Primary | ICD-10-CM

## 2023-10-09 DIAGNOSIS — Z87.891 HISTORY OF TOBACCO USE: ICD-10-CM

## 2023-10-09 DIAGNOSIS — Z23 NEED FOR INFLUENZA VACCINATION: ICD-10-CM

## 2023-10-09 DIAGNOSIS — E78.49 OTHER HYPERLIPIDEMIA: ICD-10-CM

## 2023-10-09 DIAGNOSIS — G47.26 SHIFT WORK SLEEP DISORDER: ICD-10-CM

## 2023-10-09 PROCEDURE — 90686 IIV4 VACC NO PRSV 0.5 ML IM: CPT | Performed by: FAMILY MEDICINE

## 2023-10-09 PROCEDURE — 99213 OFFICE O/P EST LOW 20 MIN: CPT | Performed by: FAMILY MEDICINE

## 2023-10-09 PROCEDURE — 90471 IMMUNIZATION ADMIN: CPT | Performed by: FAMILY MEDICINE

## 2023-10-09 RX ORDER — TRAMADOL HYDROCHLORIDE 50 MG/1
TABLET ORAL
Qty: 60 TABLET | Refills: 0 | Status: SHIPPED | OUTPATIENT
Start: 2023-10-09

## 2023-10-09 RX ORDER — MODAFINIL 100 MG/1
100 TABLET ORAL DAILY
Qty: 30 TABLET | Refills: 2 | Status: SHIPPED | OUTPATIENT
Start: 2023-10-09

## 2023-10-09 NOTE — ASSESSMENT & PLAN NOTE
Patient's (Body mass index is 29.57 kg/mý.) indicates that they are obese (BMI >30) with health conditions that include none . Weight is unchanged. BMI  is above average; BMI management plan is completed. We discussed portion control and increasing exercise.

## 2023-11-06 DIAGNOSIS — B00.9 HERPES: ICD-10-CM

## 2023-11-06 RX ORDER — ACYCLOVIR 50 MG/G
1 OINTMENT TOPICAL 2 TIMES DAILY PRN
Qty: 30 G | Refills: 11 | Status: SHIPPED | OUTPATIENT
Start: 2023-11-06

## 2023-11-08 ENCOUNTER — TELEPHONE (OUTPATIENT)
Dept: FAMILY MEDICINE CLINIC | Facility: CLINIC | Age: 63
End: 2023-11-08
Payer: COMMERCIAL

## 2023-12-05 NOTE — PROGRESS NOTES
Subjective   Sariah Arnold is a 63 y.o. female.     Chief Complaint   Patient presents with    ER followup     [facility] 11/29/2023    Extremity Laceration     Left leg     Hip Pain       History of Present Illness  Sariah was seen at [facility] on 11/30/2023. She was seen for left Leg Laceration. She reports she was in the shower and noticed a laceration on her left leg that was bleeding. She reports that she applied pressure and was unable to get wound to stop so put on a wound stop powder that helped h Labs that were performed during the encounter included: none. Diagnostic studies that were performed included: none. Medication changes: none. 1 Suture using 4 point 0 nylon was placed. She removed stitch on her own on Ramirez Dec 2nd 2023.    Hip Pain   The incident occurred more than 1 week ago. There was no injury mechanism. The pain is present in the right hip and left hip. The quality of the pain is described as aching. The pain is at a severity of 8/10. The pain is severe. The pain has been Intermittent since onset. Exacerbated by: laying down.            I personally reviewed and updated the patient's allergies, medications, problem list, and past medical, surgical, social, and family history. I have reviewed and confirmed the accuracy of the History of Present Illness and Review of Symptoms as documented by the MA/LEIGHN/RN. Ever Champagne MD    Family History   Problem Relation Age of Onset    Heart disease Mother     Hypertension Mother     Melanoma Sister     Thyroid cancer Sister     Diabetes Paternal Grandmother     Diabetes Paternal Grandfather     Cancer Half-Sister         thyroid cancer       Social History     Tobacco Use    Smoking status: Former     Packs/day: 1.50     Years: 35.00     Additional pack years: 0.00     Total pack years: 52.50     Types: Cigarettes, Electronic Cigarette     Start date: 1/1/1975     Quit date: 1/1/2013     Years since quitting: 10.9    Smokeless tobacco: Never    Vaping Use    Vaping Use: Former    Start date: 1/1/2011    Quit date: 1/1/2013    Substances: Nicotine, Flavoring    Devices: Disposable   Substance Use Topics    Alcohol use: Yes    Drug use: Never       Past Surgical History:   Procedure Laterality Date    BREAST SURGERY  1999    Begin tumor    HYSTERECTOMY      TONSILLECTOMY  1994       Patient Active Problem List   Diagnosis    History of tobacco use    Other fatigue    Overweight with body mass index (BMI) of 29 to 29.9 in adult    Other headache syndrome    Arthritis    Annual visit for general adult medical examination with abnormal findings    Screening for malignant neoplasm of colon    Screening for malignant neoplasm of cervix    Encounter for screening for malignant neoplasm of breast    Shift work sleep disorder    Herpes    Recurrent cold sores    Elbow injury, right, initial encounter    Other hyperlipidemia    Lateral epicondylitis of right elbow    Plantar fasciitis         Current Outpatient Medications:     acyclovir (ZOVIRAX) 5 % ointment, Apply 1 application topically to the appropriate area as directed 2 (Two) Times a Day As Needed (cold sores). Begin treatment at earliest sign or symptom., Disp: 30 g, Rfl: 11    loratadine (CLARITIN) 10 MG tablet, TAKE 1 TABLET BY MOUTH EVERY DAY, Disp: 90 tablet, Rfl: 3    multivitamin (THERAGRAN) tablet tablet, Take  by mouth Daily., Disp: , Rfl:     traMADol (ULTRAM) 50 MG tablet, Take 1 tablet twice daily as needed, sparingly, Disp: 60 tablet, Rfl: 0    modafinil (PROVIGIL) 100 MG tablet, TAKE 1 TABLET BY MOUTH EVERY DAY, Disp: 30 tablet, Rfl: 2    neomycin-polymyxin-hydrocortisone (CORTISPORIN) 3.5-50867-6 otic suspension, Administer 3 drops into ear(s) as directed by provider 2 (Two) Times a Day. (Patient not taking: Reported on 12/6/2023), Disp: 10 mL, Rfl: 2    predniSONE (DELTASONE) 5 MG tablet, 40mg x 3 days, 20mg x 3 days, 10mg x 3 days, 5mg x 3 days, Disp: 45 tablet, Rfl: 0    Semaglutide-Weight  "Management 0.25 MG/0.5ML solution auto-injector, Inject 0.25 mg under the skin into the appropriate area as directed 1 (One) Time Per Week., Disp: 2 mL, Rfl: 3    valACYclovir (VALTREX) 1000 MG tablet, valacyclovir 1 gram tablet (Patient not taking: Reported on 10/9/2023), Disp: , Rfl:          Review of Systems   Constitutional:  Negative for chills and diaphoresis.   Eyes:  Negative for visual disturbance.   Respiratory:  Negative for shortness of breath.    Cardiovascular:  Negative for chest pain and palpitations.   Gastrointestinal:  Negative for abdominal pain and nausea.   Endocrine: Negative for polydipsia and polyphagia.   Musculoskeletal:  Negative for neck stiffness.   Skin:  Negative for color change and pallor.   Neurological:  Negative for seizures and syncope.   Hematological:  Negative for adenopathy.   Psychiatric/Behavioral:  Negative for hallucinations and suicidal ideas.        I have reviewed and confirmed the accuracy of the ROS as documented by the MA/LPN/RN Ever Champagne MD      Objective   /76 (BP Location: Right arm, Patient Position: Sitting, Cuff Size: Adult)   Pulse 117   Temp 97.7 °F (36.5 °C)   Resp 18   Ht 167.6 cm (66\")   Wt 83 kg (183 lb)   SpO2 98%   BMI 29.54 kg/m²   BP Readings from Last 3 Encounters:   12/06/23 122/76   10/09/23 128/72   06/02/23 122/84     Wt Readings from Last 3 Encounters:   12/06/23 83 kg (183 lb)   10/09/23 83.1 kg (183 lb 3.2 oz)   06/02/23 83.2 kg (183 lb 6.4 oz)     Physical Exam  Constitutional:       Appearance: She is well-developed. She is not diaphoretic.   HENT:      Head: Normocephalic.      Right Ear: Tympanic membrane, ear canal and external ear normal.      Left Ear: Tympanic membrane, ear canal and external ear normal.      Nose: Nose normal.   Eyes:      General: Lids are normal.      Conjunctiva/sclera: Conjunctivae normal.      Pupils: Pupils are equal, round, and reactive to light.   Neck:      Thyroid: No thyromegaly.      " "Vascular: No carotid bruit or JVD.      Trachea: No tracheal deviation.   Cardiovascular:      Rate and Rhythm: Normal rate and regular rhythm.      Heart sounds: Normal heart sounds. No murmur heard.     No friction rub. No gallop.   Pulmonary:      Effort: Pulmonary effort is normal.      Breath sounds: Normal breath sounds. No stridor. No decreased breath sounds, wheezing or rales.   Abdominal:      General: Bowel sounds are normal. There is no distension.      Palpations: Abdomen is soft. There is no mass.      Tenderness: There is no abdominal tenderness. There is no guarding or rebound.      Hernia: No hernia is present.   Lymphadenopathy:      Head:      Right side of head: No submental, submandibular, tonsillar, preauricular, posterior auricular or occipital adenopathy.      Left side of head: No submental, submandibular, tonsillar, preauricular, posterior auricular or occipital adenopathy.      Cervical: No cervical adenopathy.   Skin:     General: Skin is warm and dry.      Coloration: Skin is not pale.      Comments: Incison lower leg well healed, sutures out   Neurological:      Mental Status: She is alert and oriented to person, place, and time.      Cranial Nerves: No cranial nerve deficit.      Sensory: No sensory deficit.      Coordination: Coordination normal.      Gait: Gait normal.      Deep Tendon Reflexes: Reflexes are normal and symmetric.         Data / Lab Results:    No results found for: \"HGBA1C\"     Lab Results   Component Value Date     (H) 03/17/2023     (H) 08/06/2022    LDL 93 05/15/2020     No results found for: \"CHOL\"  Lab Results   Component Value Date    TRIG 97 03/17/2023    TRIG 124 08/06/2022    TRIG 119 05/15/2020     Lab Results   Component Value Date    HDL 59 03/17/2023    HDL 51 08/06/2022    HDL 54 05/15/2020     No results found for: \"PSA\"  Lab Results   Component Value Date    WBC 7.1 03/17/2023    HGB 14.1 03/17/2023    HCT 43.4 03/17/2023    MCV 86 " "03/17/2023     03/17/2023     Lab Results   Component Value Date    TSH 2.910 03/17/2023      Lab Results   Component Value Date    GLUCOSE 108 (H) 03/17/2023    BUN 15 03/17/2023    CREATININE 0.87 03/17/2023    EGFRIFNONA 72 10/18/2020    EGFRIFAFRI 72 05/15/2020    BCR 17 03/17/2023    K 4.8 03/17/2023    CO2 25 03/17/2023    CALCIUM 9.7 03/17/2023    PROTENTOTREF 7.3 03/17/2023    ALBUMIN 4.5 03/17/2023    LABIL2 1.6 03/17/2023    AST 24 03/17/2023    ALT 24 03/17/2023     No results found for: \"VOLODYMYR\", \"RF\", \"SEDRATE\"   No results found for: \"CRP\"   No results found for: \"IRON\", \"TIBC\", \"FERRITIN\"   Lab Results   Component Value Date    [id number] 787 08/06/2022          Assessment & Plan      Medications        Problem List         LOS      Health maintenance.  Doing well, vaccines current.  Coated baby aspirin daily.  Discussed calcium and vitamin D.  Discussed health maintenance, screening test, lifestyle modification.  Followed by OB/GYN Dr. Shook mammogram current.  History of tobacco use.  Significant history, has quit in 2013.. CT scan lung cancer screening benign 2020.  EKG benign 2020.  Fatigue.  Replacing vitamin D, recheck levels.  Start regular exercise program.  Weight gain.  Improved today, has lost weight.  Following quitting smoking.  Discussed diet, exercise, lifestyle modification.  History of intolerance to Wellbutrin, not tolerating Topamax.  Follow-up recheck.  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  Add melatonin  Colon screening.  Colonoscopy 2017 with diverticulosis only.  COVID-19 viral infection.  Recurrent episode, improved/resolved currently, has completed course Paxlovid.  Improved/resolved.  October/20.  Doing well currently, resolved.  Call return if recurrent symptoms.  Has been vaccinated/recommend booster.  Low back pain.  OA hips contributing.  Ice, NSAIDs, rehabilitation exercise discussed.  Consider imaging, PT referral if persistent " symptoms.  Acute bacterial sinusitis.  Improved today, has completed course antibiotics.  He does still have stitches that  Allergic rhinitis.  OTC Claritin or Zyrtec.  Cold sores.  Start as needed acyclovir.  Lateral epicondylitis.    Improved today status post injection.  Attempting counterforce strap through Worker's Comp..  Call or return if persistent symptoms.  Plantar faaciitis.  improved today status post bilateral injection, followed by podiatry.Bilateral, she is working long shifts on concrete continue heel cups, nighttime bracing.  Ice, rehabilitation exercise discussed  Right hip pain.  Flare OA, bursitis contributing.  Start prednisone.  Ice, rehabilitation exercise discussed.  Check x-rays.  Orthopedics referral scheduled.  Leg laceration.  Clinically improved/resolved today, sutures placed in ED, removed by patient at home, healing well.      Diagnoses and all orders for this visit:    1. Encounter for examination following treatment at hospital (Primary)    2. Abrasion of left lower extremity, subsequent encounter    3. Overweight with body mass index (BMI) of 29 to 29.9 in adult    4. History of tobacco use    5. Right hip pain  -     XR Hip With or Without Pelvis 2 - 3 View Right  -     predniSONE (DELTASONE) 5 MG tablet; 40mg x 3 days, 20mg x 3 days, 10mg x 3 days, 5mg x 3 days  Dispense: 45 tablet; Refill: 0  -     Ambulatory Referral to Orthopedic Surgery    6. Acute bilateral low back pain, unspecified whether sciatica present  -     XR Spine Lumbar Complete 4+VW  -     predniSONE (DELTASONE) 5 MG tablet; 40mg x 3 days, 20mg x 3 days, 10mg x 3 days, 5mg x 3 days  Dispense: 45 tablet; Refill: 0    7. Shift work sleep disorder    8. Plantar fasciitis, bilateral  -     traMADol (ULTRAM) 50 MG tablet; Take 1 tablet twice daily as needed, sparingly  Dispense: 60 tablet; Refill: 0    9. Osteoarthritis of right hip, unspecified osteoarthritis type  -     Ambulatory Referral to Orthopedic  Surgery              Expected course, medications, and adverse effects discussed.  Call or return if worsening or persistent symptoms.  I wore protective equipment throughout this patient encounter including a mask, gloves, and eye protection.  Hand hygiene was performed before donning protective equipment and after removal when leaving the room. The complete contents of the Assessment and Plan and Data/Lab Results as documented above have been reviewed and addressed by myself with the patient today as part of an ongoing evaluation / treatment plan.  If some of the documentation has been copied from a previous note and is unchanged it indicates that this problem / plan has been assessed today but is stable from a previous visit and no changes have been recommended.

## 2023-12-06 ENCOUNTER — TELEPHONE (OUTPATIENT)
Dept: FAMILY MEDICINE CLINIC | Facility: CLINIC | Age: 63
End: 2023-12-06

## 2023-12-06 ENCOUNTER — HOSPITAL ENCOUNTER (OUTPATIENT)
Dept: GENERAL RADIOLOGY | Facility: HOSPITAL | Age: 63
Discharge: HOME | End: 2023-12-06
Payer: COMMERCIAL

## 2023-12-06 ENCOUNTER — OFFICE VISIT (OUTPATIENT)
Dept: FAMILY MEDICINE CLINIC | Facility: CLINIC | Age: 63
End: 2023-12-06
Payer: COMMERCIAL

## 2023-12-06 VITALS
TEMPERATURE: 97.7 F | HEIGHT: 66 IN | BODY MASS INDEX: 29.41 KG/M2 | SYSTOLIC BLOOD PRESSURE: 122 MMHG | DIASTOLIC BLOOD PRESSURE: 76 MMHG | WEIGHT: 183 LBS | HEART RATE: 117 BPM | OXYGEN SATURATION: 98 % | RESPIRATION RATE: 18 BRPM

## 2023-12-06 DIAGNOSIS — E66.3 OVERWEIGHT WITH BODY MASS INDEX (BMI) OF 29 TO 29.9 IN ADULT: ICD-10-CM

## 2023-12-06 DIAGNOSIS — Z87.891 HISTORY OF TOBACCO USE: ICD-10-CM

## 2023-12-06 DIAGNOSIS — M54.50 ACUTE BILATERAL LOW BACK PAIN, UNSPECIFIED WHETHER SCIATICA PRESENT: ICD-10-CM

## 2023-12-06 DIAGNOSIS — S80.812D ABRASION OF LEFT LOWER EXTREMITY, SUBSEQUENT ENCOUNTER: ICD-10-CM

## 2023-12-06 DIAGNOSIS — M72.2 PLANTAR FASCIITIS, BILATERAL: ICD-10-CM

## 2023-12-06 DIAGNOSIS — G47.26 SHIFT WORK SLEEP DISORDER: ICD-10-CM

## 2023-12-06 DIAGNOSIS — M25.551 RIGHT HIP PAIN: ICD-10-CM

## 2023-12-06 DIAGNOSIS — Z09 ENCOUNTER FOR EXAMINATION FOLLOWING TREATMENT AT HOSPITAL: Primary | ICD-10-CM

## 2023-12-06 DIAGNOSIS — M16.11 OSTEOARTHRITIS OF RIGHT HIP, UNSPECIFIED OSTEOARTHRITIS TYPE: ICD-10-CM

## 2023-12-06 PROCEDURE — 73502 X-RAY EXAM HIP UNI 2-3 VIEWS: CPT

## 2023-12-06 PROCEDURE — 72110 X-RAY EXAM L-2 SPINE 4/>VWS: CPT

## 2023-12-06 RX ORDER — PREDNISONE 5 MG/1
TABLET ORAL
Qty: 45 TABLET | Refills: 0 | Status: SHIPPED | OUTPATIENT
Start: 2023-12-06

## 2023-12-06 RX ORDER — TRAMADOL HYDROCHLORIDE 50 MG/1
TABLET ORAL
Qty: 60 TABLET | Refills: 0 | Status: SHIPPED | OUTPATIENT
Start: 2023-12-06

## 2023-12-08 DIAGNOSIS — G47.26 SHIFT WORK SLEEP DISORDER: ICD-10-CM

## 2023-12-11 RX ORDER — MODAFINIL 100 MG/1
100 TABLET ORAL DAILY
Qty: 30 TABLET | Refills: 2 | Status: SHIPPED | OUTPATIENT
Start: 2023-12-11

## 2023-12-13 DIAGNOSIS — E66.09 CLASS 1 OBESITY DUE TO EXCESS CALORIES WITHOUT SERIOUS COMORBIDITY WITH BODY MASS INDEX (BMI) OF 30.0 TO 30.9 IN ADULT: ICD-10-CM

## 2024-01-08 ENCOUNTER — TELEPHONE (OUTPATIENT)
Dept: FAMILY MEDICINE CLINIC | Facility: CLINIC | Age: 64
End: 2024-01-08
Payer: COMMERCIAL

## 2024-01-08 NOTE — TELEPHONE ENCOUNTER
Caller: Sariah Arnold    Relationship: Self    Best call back number: [phone]     What was the call regarding: DR HORVATH TOLD THE PATIENT ABOUT A PHARMACY SHE COULD GO TO ON [facility] TO GET HER WEGOVY. PLEASE CALL TO ADVISE WHICH PHARMACY IT IS AND IF DR HORVATH CAN SEND THE PRESCRIPTION OR IF THE PATIENT NEEDS TO BRING IT.

## 2024-01-09 ENCOUNTER — PATIENT MESSAGE (OUTPATIENT)
Dept: FAMILY MEDICINE CLINIC | Facility: CLINIC | Age: 64
End: 2024-01-09
Payer: COMMERCIAL

## 2024-01-09 ENCOUNTER — TELEPHONE (OUTPATIENT)
Dept: FAMILY MEDICINE CLINIC | Facility: CLINIC | Age: 64
End: 2024-01-09
Payer: COMMERCIAL

## 2024-01-09 NOTE — TELEPHONE ENCOUNTER
I called and spoke with khanh. She was unable to get wegovy through [facility]. Okay with sending to [place]. Will you advise regarding her back xray

## 2024-01-09 NOTE — TELEPHONE ENCOUNTER
----- Message from Sariah Arnold sent at 1/9/2024  1:37 PM EST -----  Regarding: Wegovy  Contact: [phone]  My question is [facility] still can’t get the wegovy in ? I would like to go ahead and get it from the pharmacy you spoke about in [place] . Do I need a prescription from you to go get it and I do plan on paying out of pocket for it. And can you give me the location of the pharmacy . Also need to know what to do about my results on my back.

## 2024-01-12 ENCOUNTER — TELEPHONE (OUTPATIENT)
Dept: FAMILY MEDICINE CLINIC | Facility: CLINIC | Age: 64
End: 2024-01-12
Payer: COMMERCIAL

## 2024-01-12 ENCOUNTER — CLINICAL SUPPORT (OUTPATIENT)
Dept: FAMILY MEDICINE CLINIC | Facility: CLINIC | Age: 64
End: 2024-01-12
Payer: COMMERCIAL

## 2024-01-12 DIAGNOSIS — M72.2 PLANTAR FASCIITIS: Primary | ICD-10-CM

## 2024-01-12 NOTE — TELEPHONE ENCOUNTER
I spoke with khanh today about her hip xray. She reports that she has had great therapeutic relief with prednisone and tramadol. She also reports ongoing foot pain and is asking if we will resubmit a referral to Dr Greenfield. (She has seen him in the past) if okay I will place referral

## 2024-02-08 DIAGNOSIS — M72.2 PLANTAR FASCIITIS, BILATERAL: ICD-10-CM

## 2024-02-09 RX ORDER — TRAMADOL HYDROCHLORIDE 50 MG/1
TABLET ORAL
Qty: 60 TABLET | Refills: 0 | Status: SHIPPED | OUTPATIENT
Start: 2024-02-09

## 2024-02-23 RX ORDER — ACYCLOVIR 400 MG/1
TABLET ORAL
Qty: 25 TABLET | Refills: 11 | Status: SHIPPED | OUTPATIENT
Start: 2024-02-23

## 2024-03-19 NOTE — PROGRESS NOTES
Subjective   Sariah Arnold is a 63 y.o. female.     Chief Complaint   Patient presents with    Annual Exam    Hip Pain    Plantar Fasciitis       The patient is here: to discuss health maintenance and disease prevention to follow up on multiple medical problems.  Last comprehensive physical was on 3/17/2023.  Previous physical was performed by  Ever Champagne MD  Overall has: vigorous activity with work/home activities, good appetite, feels well with minor complaints, good energy level, and is sleeping well. Nutrition: appropriate balanced diet. Last tetanus shot was  10/22/2009 . Patient is doing routine self breast exams: occasionally     Last Completed Mammogram            Ordered - MAMMOGRAM (Every 2 Years) Ordered on 3/22/2024      05/06/2023  SCANNED - MAMMO    02/18/2022  SCANNED - MAMMO    12/11/2020  SCANNED - MAMMO    10/04/2019  SCANNED - MAMMO    09/27/2019  SCANNED - MAMMO    Only the first 5 history entries have been loaded, but more history exists.                   Last Completed Colonoscopy            COLORECTAL CANCER SCREENING (COLONOSCOPY - Every 10 Years) Next due on 4/2/2028 04/02/2018  SCANNED - COLONOSCOPY                    Foot Pain  This is a chronic problem. The current episode started more than 1 year ago. The problem occurs constantly. The problem has been gradually worsening. Associated symptoms include joint swelling and weakness. Pertinent negatives include no abdominal pain, chest pain, chills, diaphoresis or nausea. Associated symptoms comments: Sariah stands on her feet constantly at work, and notices her feet have really been bothering her in the last 8 months. Constant pain. Currently, a 4/10 pain score but after work she states its a 10/10.     10/9- She states her feet are doing well. She has been doing injections and states they are more of a numb feeling now. Doing better than before. The symptoms are aggravated by exertion, standing and walking. She has tried rest  and NSAIDs (injections) for the symptoms.   Hyperlipidemia  This is a new problem. The current episode started more than 1 month ago. Exacerbating diseases include obesity. There are no known factors aggravating her hyperlipidemia. Associated symptoms include leg pain. Pertinent negatives include no chest pain or shortness of breath. She is currently on no antihyperlipidemic treatment. There are no compliance problems.  Risk factors for coronary artery disease include obesity.   Insomnia  Associated symptoms include joint swelling and weakness. Pertinent negatives include no abdominal pain, chest pain, chills, diaphoresis or nausea.        Recent Hospitalizations:  No hospitalization(s) within the last year..  ccc      I personally reviewed and updated the patient's allergies, medications, problem list, and past medical, surgical, social, and family history. I have reviewed and confirmed the accuracy of the HPI and ROS as documented by the MA/LPN/RN Ever Champagne MD    Family History   Problem Relation Age of Onset    Heart disease Mother     Hypertension Mother     Melanoma Sister     Thyroid cancer Sister     Diabetes Paternal Grandmother     Diabetes Paternal Grandfather     Cancer Half-Sister         thyroid cancer       Social History     Tobacco Use    Smoking status: Former     Current packs/day: 0.00     Average packs/day: 1.5 packs/day for 37.0 years (55.5 ttl pk-yrs)     Types: Cigarettes, Electronic Cigarette     Start date: 1975     Quit date: 2013     Years since quittin.2    Smokeless tobacco: Never   Vaping Use    Vaping status: Former    Start date: 2011    Quit date: 2013    Substances: Nicotine, Flavoring    Devices: Disposable   Substance Use Topics    Alcohol use: Yes    Drug use: Never       Past Surgical History:   Procedure Laterality Date    BREAST SURGERY      Begin tumor    HYSTERECTOMY      TONSILLECTOMY         Patient Active Problem List   Diagnosis     History of tobacco use    Other fatigue    Overweight with body mass index (BMI) of 29 to 29.9 in adult    Other headache syndrome    Arthritis    Annual visit for general adult medical examination with abnormal findings    Screening for malignant neoplasm of colon    Screening for malignant neoplasm of cervix    Encounter for screening for malignant neoplasm of breast    Shift work sleep disorder    Herpes    Recurrent cold sores    Elbow injury, right, initial encounter    Other hyperlipidemia    Lateral epicondylitis of right elbow    Plantar fasciitis         Current Outpatient Medications:     acyclovir (ZOVIRAX) 400 MG tablet, TAKE 1 TABLET BY MOUTH 5 TIMES A DAY., Disp: 25 tablet, Rfl: 11    acyclovir (ZOVIRAX) 5 % ointment, Apply 1 application topically to the appropriate area as directed 2 (Two) Times a Day As Needed (cold sores). Begin treatment at earliest sign or symptom., Disp: 30 g, Rfl: 11    modafinil (PROVIGIL) 100 MG tablet, TAKE 1 TABLET BY MOUTH EVERY DAY, Disp: 30 tablet, Rfl: 2    multivitamin (THERAGRAN) tablet tablet, Take  by mouth Daily., Disp: , Rfl:     Semaglutide-Weight Management 0.25 MG/0.5ML solution auto-injector, Inject 0.25 mg under the skin into the appropriate area as directed 1 (One) Time Per Week., Disp: 2 mL, Rfl: 3    traMADol (ULTRAM) 50 MG tablet, Take 1 tablet twice daily as needed, sparingly, Disp: 60 tablet, Rfl: 0    celecoxib (CeleBREX) 200 MG capsule, Take 1 capsule by mouth Daily., Disp: 90 capsule, Rfl: 3    loratadine (CLARITIN) 10 MG tablet, TAKE 1 TABLET BY MOUTH EVERY DAY (Patient not taking: Reported on 3/22/2024), Disp: 90 tablet, Rfl: 3    montelukast (SINGULAIR) 10 MG tablet, Take 1 tablet by mouth Every Night., Disp: 90 tablet, Rfl: 3    neomycin-polymyxin-hydrocortisone (CORTISPORIN) 3.5-96018-5 otic suspension, Administer 3 drops into ear(s) as directed by provider 2 (Two) Times a Day. (Patient not taking: Reported on 12/6/2023), Disp: 10 mL, Rfl: 2     "Semaglutide,0.25 or 0.5MG/DOS, (Ozempic, 0.25 or 0.5 MG/DOSE,) 2 MG/3ML solution pen-injector, Inject 0.5 mg under the skin into the appropriate area as directed 1 (One) Time Per Week. Increased 3/22/24, Disp: , Rfl:     valACYclovir (VALTREX) 1000 MG tablet, valacyclovir 1 gram tablet (Patient not taking: Reported on 10/9/2023), Disp: , Rfl:     Review of Systems   Constitutional:  Negative for chills and diaphoresis.   HENT:  Negative for trouble swallowing and voice change.    Eyes:  Negative for visual disturbance.   Respiratory:  Negative for shortness of breath.    Cardiovascular:  Negative for chest pain and palpitations.   Gastrointestinal:  Negative for abdominal pain and nausea.   Endocrine: Negative for polydipsia and polyphagia.   Genitourinary:  Negative for hematuria.   Musculoskeletal:  Positive for joint swelling. Negative for neck stiffness.   Skin:  Negative for color change and pallor.   Allergic/Immunologic: Negative for immunocompromised state.   Neurological:  Positive for weakness. Negative for seizures and syncope.   Hematological:  Negative for adenopathy.   Psychiatric/Behavioral:  Negative for hallucinations, sleep disturbance and suicidal ideas. The patient has insomnia.        I have reviewed and confirmed the accuracy of the ROS as documented by the MA/LPN/RN Ever Champagne MD      Objective   /78 (BP Location: Right arm, Patient Position: Sitting, Cuff Size: Large Adult)   Pulse 96   Temp 98.7 °F (37.1 °C) (Temporal)   Resp 20   Ht 167.6 cm (65.98\")   Wt 77.1 kg (170 lb)   SpO2 98%   BMI 27.45 kg/m²   BP Readings from Last 3 Encounters:   03/22/24 126/78   12/06/23 122/76   10/09/23 128/72     Wt Readings from Last 3 Encounters:   03/22/24 77.1 kg (170 lb)   12/06/23 83 kg (183 lb)   10/09/23 83.1 kg (183 lb 3.2 oz)     Physical Exam  Constitutional:       Appearance: She is well-developed. She is not diaphoretic.   HENT:      Head: Normocephalic.      Right Ear: Tympanic " "membrane, ear canal and external ear normal.      Left Ear: Tympanic membrane, ear canal and external ear normal.      Nose: Nose normal.   Eyes:      General: Lids are normal.      Conjunctiva/sclera: Conjunctivae normal.      Pupils: Pupils are equal, round, and reactive to light.   Neck:      Thyroid: No thyromegaly.      Vascular: No carotid bruit or JVD.      Trachea: No tracheal deviation.   Cardiovascular:      Rate and Rhythm: Normal rate and regular rhythm.      Heart sounds: Normal heart sounds. No murmur heard.     No friction rub. No gallop.   Pulmonary:      Effort: Pulmonary effort is normal.      Breath sounds: Normal breath sounds. No stridor. No decreased breath sounds, wheezing or rales.   Abdominal:      General: Bowel sounds are normal. There is no distension.      Palpations: Abdomen is soft. There is no mass.      Tenderness: There is no abdominal tenderness. There is no guarding or rebound.      Hernia: No hernia is present.   Lymphadenopathy:      Head:      Right side of head: No submental, submandibular, tonsillar, preauricular, posterior auricular or occipital adenopathy.      Left side of head: No submental, submandibular, tonsillar, preauricular, posterior auricular or occipital adenopathy.      Cervical: No cervical adenopathy.   Skin:     General: Skin is warm and dry.      Coloration: Skin is not pale.   Neurological:      Mental Status: She is alert and oriented to person, place, and time.      Cranial Nerves: No cranial nerve deficit.      Sensory: No sensory deficit.      Coordination: Coordination normal.      Gait: Gait normal.      Deep Tendon Reflexes: Reflexes are normal and symmetric.         Data / Lab Results:    No results found for: \"HGBA1C\"     Lab Results   Component Value Date     (H) 03/17/2023     (H) 08/06/2022    LDL 93 05/15/2020     No results found for: \"CHOL\"  Lab Results   Component Value Date    TRIG 97 03/17/2023    TRIG 124 08/06/2022    TRIG " "119 05/15/2020     Lab Results   Component Value Date    HDL 59 03/17/2023    HDL 51 08/06/2022    HDL 54 05/15/2020     No results found for: \"PSA\"  Lab Results   Component Value Date    WBC 7.1 03/17/2023    HGB 14.1 03/17/2023    HCT 43.4 03/17/2023    MCV 86 03/17/2023     03/17/2023     Lab Results   Component Value Date    TSH 2.910 03/17/2023     Lab Results   Component Value Date    GLUCOSE 108 (H) 03/17/2023    BUN 15 03/17/2023    CREATININE 0.87 03/17/2023    EGFRIFNONA 72 10/18/2020    EGFRIFAFRI 72 05/15/2020    BCR 17 03/17/2023    K 4.8 03/17/2023    CO2 25 03/17/2023    CALCIUM 9.7 03/17/2023    PROTENTOTREF 7.3 03/17/2023    ALBUMIN 4.5 03/17/2023    LABIL2 1.6 03/17/2023    AST 24 03/17/2023    ALT 24 03/17/2023     No results found for: \"VOLODYMYR\", \"RF\", \"SEDRATE\"   No results found for: \"CRP\"   No results found for: \"IRON\", \"TIBC\", \"FERRITIN\"   Lab Results   Component Value Date    [id number] 08/06/2022        Age-appropriate Screening Schedule:  Refer to the list below for future screening recommendations based on patient's age, sex and/or medical conditions. Orders for these recommended tests are listed in the plan section. The patient has been provided with a written plan.    Health Maintenance   Topic Date Due    ZOSTER VACCINE (1 of 2) Never done    HEPATITIS C SCREENING  Never done    PAP SMEAR  Never done    LUNG CANCER SCREENING  09/04/2021    RSV Vaccine - Adults (1 - 1-dose 60+ series) Never done    COVID-19 Vaccine (2 - 2023-24 season) 09/01/2023    LIPID PANEL  03/17/2024    ANNUAL PHYSICAL  03/22/2025    BMI FOLLOWUP  03/22/2025    MAMMOGRAM  05/06/2025    COLORECTAL CANCER SCREENING  04/02/2028    TDAP/TD VACCINES (4 - Td or Tdap) 05/01/2029    INFLUENZA VACCINE  Completed    Pneumococcal Vaccine 0-64  Aged Out           Assessment & Plan      Medications        Problem List         LOS      Physical.  Doing well, vaccines current.  Coated baby aspirin daily.  Discussed " calcium and vitamin D.  Discussed health maintenance, screening test, lifestyle modification.  Followed by OB/GYN Dr. Shook mammogram current.  History of tobacco use.  Significant history, has quit in 2013.. CT scan lung cancer screening benign 2020.  EKG benign 2020.  Fatigue.  Replacing vitamin D, recheck levels.  Start regular exercise program.  Weight gain.  Improved today, has lost weight.  Following quitting smoking.  Discussed diet, exercise, lifestyle modification.  History of intolerance to Wellbutrin, not tolerating Topamax.  Follow-up recheck.  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  Add melatonin  Colon screening.  Colonoscopy 2017 with diverticulosis only.  COVID-19 viral infection.  Recurrent episode, improved/resolved currently, has completed course Paxlovid.  Improved/resolved.  October/20.  Doing well currently, resolved.  Call return if recurrent symptoms.  Has been vaccinated/recommend booster.  Low back pain.  OA hips contributing.  Ice, NSAIDs, rehabilitation exercise discussed.  Consider imaging, PT referral if persistent symptoms.  Acute bacterial sinusitis.  Improved today, has completed course antibiotics.  He does still have stitches that  Allergic rhinitis.  OTC Claritin or Zyrtec.  Cold sores.  Start as needed acyclovir.  Lateral epicondylitis.    Improved today status post injection.  Attempting counterforce strap through Worker's Comp..  Call or return if persistent symptoms.  Plantar faaciitis.  improved today status post bilateral injection, followed by podiatry.Bilateral, she is working long shifts on concrete continue heel cups, nighttime bracing.  Ice, rehabilitation exercise discussed  Right hip pain.  Secondary to OA, bursitis contributing.  Has completed course prednisone.  Add Celebrex, check rheum panel.  Ice, rehabilitation exercise discussed.  Check x-rays.  Orthopedics referral scheduled.  Leg laceration.  Clinically improved/resolved today, sutures  placed in ED, removed by patient at home, healing well.      Diagnoses and all orders for this visit:    1. Annual visit for general adult medical examination with abnormal findings (Primary)  -     CBC & Differential  -     Comprehensive Metabolic Panel  -     TSH  -     Lipid Panel With / Chol / HDL Ratio  -     POCT urinalysis dipstick, manual    2. Other hyperlipidemia  -     Lipid Panel With / Chol / HDL Ratio    3. Shift work sleep disorder    4. Plantar fasciitis    5. History of tobacco use    6. Overweight with body mass index (BMI) of 27 to 27.9 in adult    7. Screening for malignant neoplasm of cervix    8. Screening for malignant neoplasm of colon    9. Encounter for screening mammogram for malignant neoplasm of breast    10. Other fatigue  -     CBC & Differential  -     Comprehensive Metabolic Panel  -     TSH  -     Lipid Panel With / Chol / HDL Ratio  -     Vitamin D,25-Hydroxy  -     C-reactive Protein  -     VOLODYMYR by IFA, Reflex to Titer and Pattern  -     Rheumatoid Factor  -     Uric Acid    11. Vitamin D deficiency  -     Vitamin D,25-Hydroxy    12. Hematuria, unspecified type  -     Urine Culture - Urine, Urine, Random Void    13. Arthralgia, unspecified joint  -     C-reactive Protein  -     VOLODYMYR by IFA, Reflex to Titer and Pattern  -     Rheumatoid Factor  -     Uric Acid  -     celecoxib (CeleBREX) 200 MG capsule; Take 1 capsule by mouth Daily.  Dispense: 90 capsule; Refill: 3    14. Plantar fasciitis, bilateral  -     traMADol (ULTRAM) 50 MG tablet; Take 1 tablet twice daily as needed, sparingly  Dispense: 60 tablet; Refill: 0    15. Allergic rhinitis, unspecified seasonality, unspecified trigger  -     montelukast (SINGULAIR) 10 MG tablet; Take 1 tablet by mouth Every Night.  Dispense: 90 tablet; Refill: 3    16. Class 1 obesity due to excess calories without serious comorbidity with body mass index (BMI) of 30.0 to 30.9 in adult    17. Screening mammogram for breast cancer  -     Mammo  Screening Digital Tomosynthesis Bilateral With CAD; Future        BMI is >= 25 and <30. (Overweight) The following options were offered after discussion;: exercise counseling/recommendations        Expected course, medications, and adverse effects discussed.  Call or return if worsening or persistent symptoms.  I wore protective equipment throughout this patient encounter including a mask, gloves, and eye protection.  Hand hygiene was performed before donning protective equipment and after removal when leaving the room. The complete contents of the Assessment and Plan and Data / Lab Results as documented above have been reviewed and addressed by myself with the patient today as part of an ongoing evaluation / treatment plan.  If some of the documentation has been copied from a previous note and is unchanged it indicates that this problem / plan has been assessed today but is stable from a previous visit and no changes have been recommended.  The separate E/M service provided today is significant, medically necessary, and separately identifiable.

## 2024-03-22 ENCOUNTER — OFFICE VISIT (OUTPATIENT)
Dept: FAMILY MEDICINE CLINIC | Facility: CLINIC | Age: 64
End: 2024-03-22
Payer: COMMERCIAL

## 2024-03-22 VITALS
SYSTOLIC BLOOD PRESSURE: 126 MMHG | HEART RATE: 96 BPM | BODY MASS INDEX: 27.32 KG/M2 | OXYGEN SATURATION: 98 % | RESPIRATION RATE: 20 BRPM | WEIGHT: 170 LBS | DIASTOLIC BLOOD PRESSURE: 78 MMHG | TEMPERATURE: 98.7 F | HEIGHT: 66 IN

## 2024-03-22 DIAGNOSIS — Z12.11 SCREENING FOR MALIGNANT NEOPLASM OF COLON: ICD-10-CM

## 2024-03-22 DIAGNOSIS — G47.26 SHIFT WORK SLEEP DISORDER: ICD-10-CM

## 2024-03-22 DIAGNOSIS — Z87.891 HISTORY OF TOBACCO USE: ICD-10-CM

## 2024-03-22 DIAGNOSIS — E78.49 OTHER HYPERLIPIDEMIA: ICD-10-CM

## 2024-03-22 DIAGNOSIS — E55.9 VITAMIN D DEFICIENCY: ICD-10-CM

## 2024-03-22 DIAGNOSIS — Z12.31 ENCOUNTER FOR SCREENING MAMMOGRAM FOR MALIGNANT NEOPLASM OF BREAST: ICD-10-CM

## 2024-03-22 DIAGNOSIS — E66.09 CLASS 1 OBESITY DUE TO EXCESS CALORIES WITHOUT SERIOUS COMORBIDITY WITH BODY MASS INDEX (BMI) OF 30.0 TO 30.9 IN ADULT: ICD-10-CM

## 2024-03-22 DIAGNOSIS — E66.3 OVERWEIGHT WITH BODY MASS INDEX (BMI) OF 27 TO 27.9 IN ADULT: ICD-10-CM

## 2024-03-22 DIAGNOSIS — M72.2 PLANTAR FASCIITIS, BILATERAL: ICD-10-CM

## 2024-03-22 DIAGNOSIS — M25.50 ARTHRALGIA, UNSPECIFIED JOINT: ICD-10-CM

## 2024-03-22 DIAGNOSIS — Z12.4 SCREENING FOR MALIGNANT NEOPLASM OF CERVIX: ICD-10-CM

## 2024-03-22 DIAGNOSIS — R53.83 OTHER FATIGUE: ICD-10-CM

## 2024-03-22 DIAGNOSIS — Z00.01 ANNUAL VISIT FOR GENERAL ADULT MEDICAL EXAMINATION WITH ABNORMAL FINDINGS: Primary | ICD-10-CM

## 2024-03-22 DIAGNOSIS — R31.9 HEMATURIA, UNSPECIFIED TYPE: ICD-10-CM

## 2024-03-22 DIAGNOSIS — J30.9 ALLERGIC RHINITIS, UNSPECIFIED SEASONALITY, UNSPECIFIED TRIGGER: ICD-10-CM

## 2024-03-22 DIAGNOSIS — Z12.31 SCREENING MAMMOGRAM FOR BREAST CANCER: ICD-10-CM

## 2024-03-22 DIAGNOSIS — M72.2 PLANTAR FASCIITIS: ICD-10-CM

## 2024-03-22 LAB
BILIRUB BLD-MCNC: NEGATIVE MG/DL
CLARITY, POC: CLEAR
COLOR UR: YELLOW
GLUCOSE UR STRIP-MCNC: NEGATIVE MG/DL
KETONES UR QL: NEGATIVE
LEUKOCYTE EST, POC: ABNORMAL
NITRITE UR-MCNC: NEGATIVE MG/ML
PH UR: 6.5 [PH] (ref 5–8)
PROT UR STRIP-MCNC: NEGATIVE MG/DL
RBC # UR STRIP: ABNORMAL /UL
SP GR UR: 1.01 (ref 1–1.03)
UROBILINOGEN UR QL: ABNORMAL

## 2024-03-22 RX ORDER — CELECOXIB 200 MG/1
200 CAPSULE ORAL DAILY
Qty: 90 CAPSULE | Refills: 3 | Status: SHIPPED | OUTPATIENT
Start: 2024-03-22

## 2024-03-22 RX ORDER — MONTELUKAST SODIUM 10 MG/1
10 TABLET ORAL NIGHTLY
Qty: 90 TABLET | Refills: 3 | Status: SHIPPED | OUTPATIENT
Start: 2024-03-22

## 2024-03-22 RX ORDER — TRAMADOL HYDROCHLORIDE 50 MG/1
TABLET ORAL
Qty: 60 TABLET | Refills: 0 | Status: SHIPPED | OUTPATIENT
Start: 2024-03-22

## 2024-03-22 RX ORDER — SEMAGLUTIDE 0.68 MG/ML
0.5 INJECTION, SOLUTION SUBCUTANEOUS WEEKLY
COMMUNITY

## 2024-03-24 LAB
25(OH)D3+25(OH)D2 SERPL-MCNC: 39.3 NG/ML (ref 30–100)
ALBUMIN SERPL-MCNC: 4.2 G/DL (ref 3.9–4.9)
ALBUMIN/GLOB SERPL: 1.8 {RATIO} (ref 1.2–2.2)
ALP SERPL-CCNC: 126 IU/L (ref 44–121)
ALT SERPL-CCNC: 14 IU/L (ref 0–32)
AST SERPL-CCNC: 20 IU/L (ref 0–40)
BACTERIA UR CULT: NORMAL
BACTERIA UR CULT: NORMAL
BASOPHILS # BLD AUTO: 0.1 X10E3/UL (ref 0–0.2)
BASOPHILS NFR BLD AUTO: 1 %
BILIRUB SERPL-MCNC: 0.4 MG/DL (ref 0–1.2)
BUN SERPL-MCNC: 15 MG/DL (ref 8–27)
BUN/CREAT SERPL: 18 (ref 12–28)
CALCIUM SERPL-MCNC: 9.6 MG/DL (ref 8.7–10.3)
CHLORIDE SERPL-SCNC: 105 MMOL/L (ref 96–106)
CHOLEST SERPL-MCNC: 181 MG/DL (ref 100–199)
CHOLEST/HDLC SERPL: 3.2 RATIO (ref 0–4.4)
CO2 SERPL-SCNC: 24 MMOL/L (ref 20–29)
CREAT SERPL-MCNC: 0.83 MG/DL (ref 0.57–1)
CRP SERPL-MCNC: 5 MG/L (ref 0–10)
EGFRCR SERPLBLD CKD-EPI 2021: 80 ML/MIN/1.73
EOSINOPHIL # BLD AUTO: 0.2 X10E3/UL (ref 0–0.4)
EOSINOPHIL NFR BLD AUTO: 3 %
ERYTHROCYTE [DISTWIDTH] IN BLOOD BY AUTOMATED COUNT: 12.4 % (ref 11.7–15.4)
GLOBULIN SER CALC-MCNC: 2.3 G/DL (ref 1.5–4.5)
GLUCOSE SERPL-MCNC: 89 MG/DL (ref 70–99)
HCT VFR BLD AUTO: 42.3 % (ref 34–46.6)
HDLC SERPL-MCNC: 56 MG/DL
HGB BLD-MCNC: 14.1 G/DL (ref 11.1–15.9)
IMM GRANULOCYTES # BLD AUTO: 0 X10E3/UL (ref 0–0.1)
IMM GRANULOCYTES NFR BLD AUTO: 0 %
LDLC SERPL CALC-MCNC: 106 MG/DL (ref 0–99)
LYMPHOCYTES # BLD AUTO: 1.9 X10E3/UL (ref 0.7–3.1)
LYMPHOCYTES NFR BLD AUTO: 32 %
MCH RBC QN AUTO: 29 PG (ref 26.6–33)
MCHC RBC AUTO-ENTMCNC: 33.3 G/DL (ref 31.5–35.7)
MCV RBC AUTO: 87 FL (ref 79–97)
MONOCYTES # BLD AUTO: 0.4 X10E3/UL (ref 0.1–0.9)
MONOCYTES NFR BLD AUTO: 6 %
NEUTROPHILS # BLD AUTO: 3.5 X10E3/UL (ref 1.4–7)
NEUTROPHILS NFR BLD AUTO: 58 %
PLATELET # BLD AUTO: 271 X10E3/UL (ref 150–450)
POTASSIUM SERPL-SCNC: 4.2 MMOL/L (ref 3.5–5.2)
PROT SERPL-MCNC: 6.5 G/DL (ref 6–8.5)
RBC # BLD AUTO: 4.87 X10E6/UL (ref 3.77–5.28)
RHEUMATOID FACT SERPL-ACNC: <10 IU/ML
SODIUM SERPL-SCNC: 143 MMOL/L (ref 134–144)
TRIGL SERPL-MCNC: 105 MG/DL (ref 0–149)
TSH SERPL DL<=0.005 MIU/L-ACNC: 2.33 UIU/ML (ref 0.45–4.5)
URATE SERPL-MCNC: 5.8 MG/DL (ref 3–7.2)
VLDLC SERPL CALC-MCNC: 19 MG/DL (ref 5–40)
WBC # BLD AUTO: 6.1 X10E3/UL (ref 3.4–10.8)

## 2024-03-25 ENCOUNTER — TELEPHONE (OUTPATIENT)
Dept: FAMILY MEDICINE CLINIC | Facility: CLINIC | Age: 64
End: 2024-03-25
Payer: COMMERCIAL

## 2024-03-25 NOTE — TELEPHONE ENCOUNTER
I called and spoke with patient to follow up on podiatry referral. Patient has no scheduled an appointment yet, but does plan to schedule soon. She is going to let us know when she schedules with Dr Greenfield.

## 2024-03-26 LAB — ANA SER QL IF: NEGATIVE

## 2024-05-13 DIAGNOSIS — E66.09 CLASS 1 OBESITY DUE TO EXCESS CALORIES WITHOUT SERIOUS COMORBIDITY WITH BODY MASS INDEX (BMI) OF 30.0 TO 30.9 IN ADULT: ICD-10-CM

## 2024-05-13 DIAGNOSIS — M72.2 PLANTAR FASCIITIS, BILATERAL: ICD-10-CM

## 2024-05-14 RX ORDER — ACYCLOVIR 400 MG/1
400 TABLET ORAL
Qty: 25 TABLET | Refills: 11 | Status: SHIPPED | OUTPATIENT
Start: 2024-05-14

## 2024-05-14 RX ORDER — TRAMADOL HYDROCHLORIDE 50 MG/1
TABLET ORAL
Qty: 60 TABLET | Refills: 0 | Status: SHIPPED | OUTPATIENT
Start: 2024-05-14

## 2024-06-05 ENCOUNTER — TELEPHONE (OUTPATIENT)
Dept: FAMILY MEDICINE CLINIC | Facility: CLINIC | Age: 64
End: 2024-06-05
Payer: COMMERCIAL

## 2024-07-05 DIAGNOSIS — J30.89 SEASONAL ALLERGIC RHINITIS DUE TO OTHER ALLERGIC TRIGGER: ICD-10-CM

## 2024-07-05 RX ORDER — LORATADINE 10 MG/1
TABLET ORAL
Qty: 90 TABLET | Refills: 3 | Status: SHIPPED | OUTPATIENT
Start: 2024-07-05

## 2024-07-10 NOTE — PROGRESS NOTES
Sariah is here to follow up on Plantar Fasciitis and Left Ankle Pain. Paper Charting scanned into chart due to computers being down on 7/19/2024 visit.     Orders  Rx Written for Prednisone 5mg Taper.   Written order for Lace up ankle brace.   Tramadol was refilled.  X-Ray for Right Hand And Wrist ordered.     If Left Ankle and Foot does not improve with Prednisone, order an x-ray.  Patient scheduled for 3 month follow up.    BMI is >= 25 and <30. (Overweight) The following options were offered after discussion;: exercise counseling/recommendations and nutrition counseling/recommendations

## 2024-07-19 ENCOUNTER — OFFICE VISIT (OUTPATIENT)
Dept: FAMILY MEDICINE CLINIC | Facility: CLINIC | Age: 64
End: 2024-07-19
Payer: COMMERCIAL

## 2024-07-19 DIAGNOSIS — E66.09 CLASS 1 OBESITY DUE TO EXCESS CALORIES WITHOUT SERIOUS COMORBIDITY WITH BODY MASS INDEX (BMI) OF 30.0 TO 30.9 IN ADULT: ICD-10-CM

## 2024-07-19 DIAGNOSIS — M25.531 RIGHT WRIST PAIN: ICD-10-CM

## 2024-07-19 DIAGNOSIS — Z87.891 HISTORY OF TOBACCO USE: ICD-10-CM

## 2024-07-19 DIAGNOSIS — M72.2 PLANTAR FASCIITIS: ICD-10-CM

## 2024-07-19 DIAGNOSIS — M72.2 PLANTAR FASCIITIS, BILATERAL: Primary | ICD-10-CM

## 2024-07-19 DIAGNOSIS — M19.90 ARTHRITIS: ICD-10-CM

## 2024-07-19 PROCEDURE — 99213 OFFICE O/P EST LOW 20 MIN: CPT | Performed by: FAMILY MEDICINE

## 2024-07-19 RX ORDER — TRAMADOL HYDROCHLORIDE 50 MG/1
TABLET ORAL
Qty: 60 TABLET | Refills: 0 | Status: SHIPPED | OUTPATIENT
Start: 2024-07-19

## 2024-07-22 VITALS
TEMPERATURE: 98.2 F | SYSTOLIC BLOOD PRESSURE: 128 MMHG | HEART RATE: 79 BPM | WEIGHT: 160.6 LBS | HEIGHT: 66 IN | DIASTOLIC BLOOD PRESSURE: 62 MMHG | RESPIRATION RATE: 16 BRPM | BODY MASS INDEX: 25.81 KG/M2 | OXYGEN SATURATION: 99 %

## 2024-07-29 ENCOUNTER — TELEPHONE (OUTPATIENT)
Dept: FAMILY MEDICINE CLINIC | Facility: CLINIC | Age: 64
End: 2024-07-29
Payer: COMMERCIAL

## 2024-07-29 DIAGNOSIS — M72.2 PLANTAR FASCIITIS, BILATERAL: Primary | ICD-10-CM

## 2024-07-29 DIAGNOSIS — M79.672 LEFT FOOT PAIN: ICD-10-CM

## 2024-07-29 NOTE — TELEPHONE ENCOUNTER
Patient was seen on 7/19/2024 for Left Ankle and Foot Pain. Dr Champagne advised if Prednisone did not help the pain to order an x-ray. I placed the order and let the patient know

## 2024-07-29 NOTE — TELEPHONE ENCOUNTER
----- Message from [facility] [email or web address] sent at 7/29/2024  8:59 AM EDT -----  Regarding: Ankle  Contact: [phone]  My ankle is still hurting and swollen . I was going to see if you can schedule an x-ray of it . I know you have one for my hand I would like to get them both done at the same time. Thank you

## 2024-07-30 ENCOUNTER — HOSPITAL ENCOUNTER (OUTPATIENT)
Dept: GENERAL RADIOLOGY | Facility: HOSPITAL | Age: 64
Discharge: HOME | End: 2024-07-30
Payer: COMMERCIAL

## 2024-07-30 PROCEDURE — 73110 X-RAY EXAM OF WRIST: CPT

## 2024-07-30 PROCEDURE — 73630 X-RAY EXAM OF FOOT: CPT

## 2024-07-30 PROCEDURE — 73130 X-RAY EXAM OF HAND: CPT

## 2024-07-30 PROCEDURE — 73610 X-RAY EXAM OF ANKLE: CPT

## 2024-08-02 PROBLEM — M25.531 RIGHT WRIST PAIN: Status: ACTIVE | Noted: 2024-08-02

## 2024-08-19 RX ORDER — SEMAGLUTIDE 0.68 MG/ML
INJECTION, SOLUTION SUBCUTANEOUS
Qty: 2 ML | Refills: 0 | OUTPATIENT
Start: 2024-08-19

## 2024-08-19 RX ORDER — SEMAGLUTIDE 0.68 MG/ML
0.5 INJECTION, SOLUTION SUBCUTANEOUS WEEKLY
OUTPATIENT
Start: 2024-08-19

## 2024-10-01 DIAGNOSIS — G47.26 SHIFT WORK SLEEP DISORDER: ICD-10-CM

## 2024-10-01 DIAGNOSIS — M72.2 PLANTAR FASCIITIS, BILATERAL: ICD-10-CM

## 2024-10-01 RX ORDER — MODAFINIL 100 MG/1
100 TABLET ORAL DAILY
Qty: 30 TABLET | Refills: 2 | Status: SHIPPED | OUTPATIENT
Start: 2024-10-01

## 2024-10-01 RX ORDER — TRAMADOL HYDROCHLORIDE 50 MG/1
TABLET ORAL
Qty: 60 TABLET | Refills: 0 | Status: SHIPPED | OUTPATIENT
Start: 2024-10-01

## 2024-10-11 DIAGNOSIS — E66.09 CLASS 1 OBESITY DUE TO EXCESS CALORIES WITHOUT SERIOUS COMORBIDITY WITH BODY MASS INDEX (BMI) OF 30.0 TO 30.9 IN ADULT: Primary | ICD-10-CM

## 2024-10-11 DIAGNOSIS — E66.811 CLASS 1 OBESITY DUE TO EXCESS CALORIES WITHOUT SERIOUS COMORBIDITY WITH BODY MASS INDEX (BMI) OF 30.0 TO 30.9 IN ADULT: Primary | ICD-10-CM

## 2024-10-11 RX ORDER — SEMAGLUTIDE 0.68 MG/ML
0.5 INJECTION, SOLUTION SUBCUTANEOUS WEEKLY
Qty: 2 ML | Refills: 1 | Status: SHIPPED | OUTPATIENT
Start: 2024-10-11

## 2024-10-14 NOTE — PROGRESS NOTES
Subjective   Sariah Arnold is a 63 y.o. female.     Chief Complaint   Patient presents with    Plantar Fasciitis    Urinary Tract Infection     Seen at Urgent Care 10/16/2024       Foot Pain  This is a chronic problem. The current episode started more than 1 year ago. The problem occurs constantly. The problem has been gradually worsening. Associated symptoms include joint swelling and weakness. Pertinent negatives include no abdominal pain, chest pain, chills, diaphoresis or nausea. Associated symptoms comments: Sariah stands on her feet constantly at work, and notices her feet have really been bothering her in the last 8 months. Constant pain. Currently, a 4/10 pain score but after work she states its a 10/10.     10/9- She states her feet are doing well. She has been doing injections and states they are more of a numb feeling now. Doing better than before. The symptoms are aggravated by exertion, standing and walking. She has tried rest and NSAIDs (injections) for the symptoms.   URI   Associated symptoms include dysuria. Pertinent negatives include no abdominal pain, chest pain or nausea. Treatments tried: Nitrofurantoin Mono-MCR- 100mg Phenazopyridine 100mg.            I personally reviewed and updated the patient's allergies, medications, problem list, and past medical, surgical, social, and family history. I have reviewed and confirmed the accuracy of the History of Present Illness and Review of Symptoms as documented by the MA/LEIGHN/RN. Ever Champagne MD    Family History   Problem Relation Age of Onset    Heart disease Mother     Hypertension Mother     Melanoma Sister     Thyroid cancer Sister     Diabetes Paternal Grandmother     Diabetes Paternal Grandfather     Cancer Half-Sister         thyroid cancer       Social History     Tobacco Use    Smoking status: Former     Current packs/day: 0.00     Average packs/day: 1.5 packs/day for 37.0 years (55.5 ttl pk-yrs)     Types: Cigarettes, Electronic Cigarette      Start date: 1975     Quit date: 2013     Years since quittin.8    Smokeless tobacco: Never   Vaping Use    Vaping status: Former    Start date: 2011    Quit date: 2013    Substances: Nicotine, Flavoring    Devices: Disposable   Substance Use Topics    Alcohol use: Yes    Drug use: Never       Past Surgical History:   Procedure Laterality Date    BREAST SURGERY      Begin tumor    HYSTERECTOMY      TONSILLECTOMY         Patient Active Problem List   Diagnosis    History of tobacco use    Other fatigue    Overweight with body mass index (BMI) of 29 to 29.9 in adult    Other headache syndrome    Arthritis    Annual visit for general adult medical examination with abnormal findings    Screening for malignant neoplasm of colon    Screening for malignant neoplasm of cervix    Encounter for screening for malignant neoplasm of breast    Shift work sleep disorder    Herpes    Recurrent cold sores    Elbow injury, right, initial encounter    Other hyperlipidemia    Lateral epicondylitis of right elbow    Plantar fasciitis    Right wrist pain    Peripheral polyneuropathy         Current Outpatient Medications:     acyclovir (ZOVIRAX) 400 MG tablet, Take 1 tablet by mouth 5 (Five) Times a Day. Take no more than 5 doses a day., Disp: 25 tablet, Rfl: 11    acyclovir (ZOVIRAX) 5 % ointment, Apply 1 application topically to the appropriate area as directed 2 (Two) Times a Day As Needed (cold sores). Begin treatment at earliest sign or symptom., Disp: 30 g, Rfl: 11    loratadine (CLARITIN) 10 MG tablet, TAKE 1 TABLET BY MOUTH EVERY DAY, Disp: 90 tablet, Rfl: 3    multivitamin (THERAGRAN) tablet tablet, Take  by mouth Daily., Disp: , Rfl:     Semaglutide,0.25 or 0.5MG/DOS, (Ozempic, 0.25 or 0.5 MG/DOSE,) 2 MG/3ML solution pen-injector, Inject 0.5 mg under the skin into the appropriate area as directed 1 (One) Time Per Week. Okay to compound, Disp: 2 mL, Rfl: 1    traMADol (ULTRAM) 50 MG tablet, TAKE 1  TABLET BY MOUTH TWICE A DAY AS NEEDED, SPARINGLY, Disp: 60 tablet, Rfl: 0    valACYclovir (VALTREX) 1000 MG tablet, , Disp: , Rfl:     amitriptyline (ELAVIL) 25 MG tablet, Take 0.5-1 tablets by mouth At Night As Needed for Sleep., Disp: 90 tablet, Rfl: 3    celecoxib (CeleBREX) 200 MG capsule, Take 1 capsule by mouth Daily. (Patient not taking: Reported on 10/18/2024), Disp: 90 capsule, Rfl: 3    modafinil (PROVIGIL) 100 MG tablet, TAKE 1 TABLET BY MOUTH EVERY DAY (Patient not taking: Reported on 10/18/2024), Disp: 30 tablet, Rfl: 2    montelukast (SINGULAIR) 10 MG tablet, Take 1 tablet by mouth Every Night. (Patient not taking: Reported on 10/18/2024), Disp: 90 tablet, Rfl: 3    neomycin-polymyxin-hydrocortisone (CORTISPORIN) 3.5-38203-0 otic suspension, Administer 3 drops into ear(s) as directed by provider 2 (Two) Times a Day. (Patient not taking: Reported on 10/18/2024), Disp: 10 mL, Rfl: 2         Review of Systems   Constitutional:  Negative for chills and diaphoresis.   Eyes:  Negative for visual disturbance.   Respiratory:  Negative for shortness of breath.    Cardiovascular:  Negative for chest pain and palpitations.   Gastrointestinal:  Negative for abdominal pain and nausea.   Endocrine: Negative for polydipsia and polyphagia.   Genitourinary:  Positive for dysuria, flank pain, frequency, hematuria and urgency. Negative for pelvic pain.   Musculoskeletal:  Positive for back pain and joint swelling. Negative for neck stiffness.   Skin:  Negative for color change and pallor.   Neurological:  Positive for weakness. Negative for dizziness, seizures, syncope and headache.   Hematological:  Negative for adenopathy.   Psychiatric/Behavioral:  Negative for hallucinations and suicidal ideas.        I have reviewed and confirmed the accuracy of the ROS as documented by the MA/LPN/RN Ever Champagne MD      Objective   /74 (BP Location: Right arm, Patient Position: Sitting, Cuff Size: Adult)   Pulse 60   Temp  "98.2 °F (36.8 °C) (Temporal)   Resp 18   Ht 168 cm (66.14\")   Wt 72.4 kg (159 lb 9.6 oz)   SpO2 98%   BMI 25.65 kg/m²   BP Readings from Last 3 Encounters:   10/18/24 124/74   07/22/24 128/62   03/22/24 126/78     Wt Readings from Last 3 Encounters:   10/18/24 72.4 kg (159 lb 9.6 oz)   07/22/24 72.8 kg (160 lb 9.6 oz)   03/22/24 77.1 kg (170 lb)     Physical Exam  Constitutional:       Appearance: She is well-developed. She is not diaphoretic.   HENT:      Head: Normocephalic.      Right Ear: Tympanic membrane, ear canal and external ear normal.      Left Ear: Tympanic membrane, ear canal and external ear normal.      Nose: Nose normal.   Eyes:      General: Lids are normal.      Conjunctiva/sclera: Conjunctivae normal.      Pupils: Pupils are equal, round, and reactive to light.   Neck:      Thyroid: No thyromegaly.      Vascular: No carotid bruit or JVD.      Trachea: No tracheal deviation.   Cardiovascular:      Rate and Rhythm: Normal rate and regular rhythm.      Heart sounds: Normal heart sounds. No murmur heard.     No friction rub. No gallop.   Pulmonary:      Effort: Pulmonary effort is normal.      Breath sounds: Normal breath sounds. No stridor. No decreased breath sounds, wheezing or rales.   Abdominal:      General: Bowel sounds are normal. There is no distension.      Palpations: Abdomen is soft. There is no mass.      Tenderness: There is no abdominal tenderness. There is no guarding or rebound.      Hernia: No hernia is present.   Musculoskeletal:      Right ankle: Normal. No swelling, deformity or ecchymosis. No tenderness. No lateral malleolus, medial malleolus, AITF ligament, CF ligament, posterior TF ligament or proximal fibula tenderness. Normal range of motion.      Right Achilles Tendon: Normal. Vela's test negative.      Left ankle: Normal. No swelling, deformity or ecchymosis. No tenderness. No lateral malleolus, medial malleolus, AITF ligament, CF ligament, posterior TF ligament or " "proximal fibula tenderness. Normal range of motion. Normal pulse.      Left Achilles Tendon: Normal. Vela's test negative.      Right foot: Normal. Normal range of motion. No swelling, deformity, tenderness or crepitus.      Left foot: Normal. Normal range of motion. No swelling, deformity, tenderness or crepitus.   Lymphadenopathy:      Head:      Right side of head: No submental, submandibular, tonsillar, preauricular, posterior auricular or occipital adenopathy.      Left side of head: No submental, submandibular, tonsillar, preauricular, posterior auricular or occipital adenopathy.      Cervical: No cervical adenopathy.   Skin:     General: Skin is warm and dry.      Coloration: Skin is not pale.   Neurological:      Mental Status: She is alert and oriented to person, place, and time.      Cranial Nerves: No cranial nerve deficit.      Sensory: No sensory deficit.      Motor: No tremor, abnormal muscle tone or seizure activity.      Coordination: Coordination normal.      Gait: Gait normal.      Deep Tendon Reflexes: Reflexes are normal and symmetric.         Data / Lab Results:    No results found for: \"HGBA1C\"     Lab Results   Component Value Date     (H) 03/23/2024     (H) 03/17/2023     (H) 08/06/2022     No results found for: \"CHOL\"  Lab Results   Component Value Date    TRIG 105 03/23/2024    TRIG 97 03/17/2023    TRIG 124 08/06/2022     Lab Results   Component Value Date    HDL 56 03/23/2024    HDL 59 03/17/2023    HDL 51 08/06/2022     No results found for: \"PSA\"  Lab Results   Component Value Date    WBC 6.1 03/23/2024    HGB 14.1 03/23/2024    HCT 42.3 03/23/2024    MCV 87 03/23/2024     03/23/2024     Lab Results   Component Value Date    TSH 2.330 03/23/2024      Lab Results   Component Value Date    GLUCOSE 89 03/23/2024    BUN 15 03/23/2024    CREATININE 0.83 03/23/2024    EGFRIFNONA 72 10/18/2020    EGFRIFAFRI 72 05/15/2020    BCR 18 03/23/2024    K 4.2 03/23/2024 " "   CO2 24 03/23/2024    CALCIUM 9.6 03/23/2024    PROTENTOTREF 6.5 03/23/2024    ALBUMIN 4.2 03/23/2024    LABIL2 1.8 03/23/2024    AST 20 03/23/2024    ALT 14 03/23/2024     Lab Results   Component Value Date    VOLODYMYR Negative 03/23/2024      No results found for: \"CRP\"   No results found for: \"IRON\", \"TIBC\", \"FERRITIN\"   Lab Results   Component Value Date    [id number] 08/06/2022          Assessment & Plan      Medications        Problem List         LOS      Health maintenance.  Doing well, vaccines current.  Coated baby aspirin daily.  Discussed calcium and vitamin D.  Discussed health maintenance, screening test, lifestyle modification.  Followed by OB/GYN Dr. Shook mammogram current.  History of tobacco use.  Significant history, has quit in 2013.. CT scan lung cancer screening benign 2020.  EKG benign 2020.  Fatigue.  Replacing vitamin D, recheck levels.  Start regular exercise program.  Weight gain.  Improved today, has lost weight.  Following quitting smoking.  Discussed diet, exercise, lifestyle modification.  History of intolerance to Wellbutrin, not tolerating Topamax.  Follow-up recheck.  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  Add melatonin  Colon screening.  Colonoscopy 2017 with diverticulosis only.  COVID-19 viral infection.  Recurrent episode, improved/resolved currently, has completed course Paxlovid.  Improved/resolved.  October/20.  Doing well currently, resolved.  Call return if recurrent symptoms.  Has been vaccinated/recommend booster.  Low back pain.  OA hips contributing.  Ice, NSAIDs, rehabilitation exercise discussed.  Consider imaging, PT referral if persistent symptoms.  Acute bacterial sinusitis.  Improved today, has completed course antibiotics.  He does still have stitches that  Allergic rhinitis.  OTC Claritin or Zyrtec.  Cold sores.  Start as needed acyclovir.  Lateral epicondylitis.    Improved today status post injection.  Attempting counterforce strap " through Worker's Comp..  Call or return if persistent symptoms.  Peripheral neuropathy..  Likely contributing to foot pain.  Start nightly amitriptyline.  Follow-up recheck.  Plantar faaciitis.  improved today status post bilateral injection, followed by podiatry.Bilateral, she is working long shifts on concrete continue heel cups, nighttime bracing.  Ice, rehabilitation exercise discussed  Right hip pain.  Secondary to OA, bursitis contributing.  Has completed course prednisone.  Add Celebrex, check rheum panel.  Ice, rehabilitation exercise discussed.  Check x-rays.  Orthopedics referral scheduled.  UTI.  Clinically improved today, on Macrobid per urgent care.  Cartrette fever worsening symptoms..    Diagnoses and all orders for this visit:    1. Plantar fasciitis (Primary)    2. Left foot pain    3. Overweight with body mass index (BMI) of 25 to 25.9 in adult    4. Acute UTI  -     Cancel: POCT urinalysis dipstick, manual    5. Need for influenza vaccination  -     Fluzone >6mos    6. Shift work sleep disorder  -     amitriptyline (ELAVIL) 25 MG tablet; Take 0.5-1 tablets by mouth At Night As Needed for Sleep.  Dispense: 90 tablet; Refill: 3    7. Peripheral polyneuropathy      BMI is >= 25 and <30. (Overweight) The following options were offered after discussion;: exercise counseling/recommendations and nutrition counseling/recommendations          Expected course, medications, and adverse effects discussed.  Call or return if worsening or persistent symptoms.  I wore protective equipment throughout this patient encounter including a mask, gloves, and eye protection.  Hand hygiene was performed before donning protective equipment and after removal when leaving the room. The complete contents of the Assessment and Plan and Data/Lab Results as documented above have been reviewed and addressed by myself with the patient today as part of an ongoing evaluation / treatment plan.  If some of the documentation has been  copied from a previous note and is unchanged it indicates that this problem / plan has been assessed today but is stable from a previous visit and no changes have been recommended.

## 2024-10-18 ENCOUNTER — OFFICE VISIT (OUTPATIENT)
Dept: FAMILY MEDICINE CLINIC | Facility: CLINIC | Age: 64
End: 2024-10-18
Payer: COMMERCIAL

## 2024-10-18 VITALS
TEMPERATURE: 98.2 F | RESPIRATION RATE: 18 BRPM | SYSTOLIC BLOOD PRESSURE: 124 MMHG | OXYGEN SATURATION: 98 % | HEIGHT: 66 IN | DIASTOLIC BLOOD PRESSURE: 74 MMHG | HEART RATE: 60 BPM | WEIGHT: 159.6 LBS | BODY MASS INDEX: 25.65 KG/M2

## 2024-10-18 DIAGNOSIS — Z23 NEED FOR INFLUENZA VACCINATION: ICD-10-CM

## 2024-10-18 DIAGNOSIS — M79.672 LEFT FOOT PAIN: ICD-10-CM

## 2024-10-18 DIAGNOSIS — E66.3 OVERWEIGHT WITH BODY MASS INDEX (BMI) OF 25 TO 25.9 IN ADULT: ICD-10-CM

## 2024-10-18 DIAGNOSIS — G62.9 PERIPHERAL POLYNEUROPATHY: ICD-10-CM

## 2024-10-18 DIAGNOSIS — M72.2 PLANTAR FASCIITIS: Primary | ICD-10-CM

## 2024-10-18 DIAGNOSIS — G47.26 SHIFT WORK SLEEP DISORDER: ICD-10-CM

## 2024-10-18 DIAGNOSIS — N39.0 ACUTE UTI: ICD-10-CM

## 2024-10-18 PROCEDURE — 99213 OFFICE O/P EST LOW 20 MIN: CPT | Performed by: FAMILY MEDICINE

## 2024-10-18 PROCEDURE — 90471 IMMUNIZATION ADMIN: CPT | Performed by: FAMILY MEDICINE

## 2024-10-18 PROCEDURE — 90656 IIV3 VACC NO PRSV 0.5 ML IM: CPT | Performed by: FAMILY MEDICINE

## 2024-11-08 DIAGNOSIS — E66.811 CLASS 1 OBESITY DUE TO EXCESS CALORIES WITHOUT SERIOUS COMORBIDITY WITH BODY MASS INDEX (BMI) OF 30.0 TO 30.9 IN ADULT: ICD-10-CM

## 2024-11-08 DIAGNOSIS — E66.09 CLASS 1 OBESITY DUE TO EXCESS CALORIES WITHOUT SERIOUS COMORBIDITY WITH BODY MASS INDEX (BMI) OF 30.0 TO 30.9 IN ADULT: ICD-10-CM

## 2024-11-08 DIAGNOSIS — M72.2 PLANTAR FASCIITIS, BILATERAL: ICD-10-CM

## 2024-11-08 RX ORDER — TRAMADOL HYDROCHLORIDE 50 MG/1
TABLET ORAL
Qty: 60 TABLET | Refills: 0 | Status: SHIPPED | OUTPATIENT
Start: 2024-11-08

## 2024-11-08 RX ORDER — SEMAGLUTIDE 0.68 MG/ML
0.5 INJECTION, SOLUTION SUBCUTANEOUS WEEKLY
Qty: 2 ML | Refills: 1 | Status: SHIPPED | OUTPATIENT
Start: 2024-11-08

## 2024-12-09 DIAGNOSIS — E66.09 CLASS 1 OBESITY DUE TO EXCESS CALORIES WITHOUT SERIOUS COMORBIDITY WITH BODY MASS INDEX (BMI) OF 30.0 TO 30.9 IN ADULT: ICD-10-CM

## 2024-12-09 DIAGNOSIS — E66.811 CLASS 1 OBESITY DUE TO EXCESS CALORIES WITHOUT SERIOUS COMORBIDITY WITH BODY MASS INDEX (BMI) OF 30.0 TO 30.9 IN ADULT: ICD-10-CM

## 2024-12-09 RX ORDER — SEMAGLUTIDE 0.68 MG/ML
0.5 INJECTION, SOLUTION SUBCUTANEOUS WEEKLY
Qty: 2 ML | Refills: 1 | Status: SHIPPED | OUTPATIENT
Start: 2024-12-09

## 2024-12-11 DIAGNOSIS — J30.89 SEASONAL ALLERGIC RHINITIS DUE TO OTHER ALLERGIC TRIGGER: ICD-10-CM

## 2024-12-11 RX ORDER — LORATADINE 10 MG/1
10 TABLET ORAL DAILY
Qty: 90 TABLET | Refills: 3 | Status: SHIPPED | OUTPATIENT
Start: 2024-12-11

## 2025-01-02 DIAGNOSIS — M72.2 PLANTAR FASCIITIS, BILATERAL: ICD-10-CM

## 2025-01-02 DIAGNOSIS — E66.09 CLASS 1 OBESITY DUE TO EXCESS CALORIES WITHOUT SERIOUS COMORBIDITY WITH BODY MASS INDEX (BMI) OF 30.0 TO 30.9 IN ADULT: ICD-10-CM

## 2025-01-02 DIAGNOSIS — E66.811 CLASS 1 OBESITY DUE TO EXCESS CALORIES WITHOUT SERIOUS COMORBIDITY WITH BODY MASS INDEX (BMI) OF 30.0 TO 30.9 IN ADULT: ICD-10-CM

## 2025-01-03 RX ORDER — TRAMADOL HYDROCHLORIDE 50 MG/1
TABLET ORAL
Qty: 60 TABLET | Refills: 0 | Status: SHIPPED | OUTPATIENT
Start: 2025-01-03

## 2025-01-03 RX ORDER — SEMAGLUTIDE 0.68 MG/ML
0.5 INJECTION, SOLUTION SUBCUTANEOUS WEEKLY
Qty: 2 ML | Refills: 1 | Status: SHIPPED | OUTPATIENT
Start: 2025-01-03

## 2025-01-10 DIAGNOSIS — G47.26 SHIFT WORK SLEEP DISORDER: ICD-10-CM

## 2025-01-10 RX ORDER — MODAFINIL 100 MG/1
100 TABLET ORAL DAILY
Qty: 30 TABLET | Refills: 2 | Status: SHIPPED | OUTPATIENT
Start: 2025-01-10

## 2025-01-15 NOTE — PROGRESS NOTES
Subjective   Sariah Arnold is a 64 y.o. female.     Chief Complaint   Patient presents with    Plantar Fasciitis       Foot Pain  This is a chronic problem. The current episode started more than 1 year ago. The problem occurs constantly. The problem has been unchanged. Associated symptoms include numbness and weakness. Pertinent negatives include no abdominal pain, chest pain, chills, diaphoresis or nausea. Associated symptoms comments: Sariah stands on her feet constantly at work, and notices her feet have really been bothering her in the last 8 months. Constant pain. Currently, a 4/10 pain score but after work she states its a 10/10.     10/9- She states her feet are doing well. She has been doing injections and states they are more of a numb feeling now. Doing better than before. The symptoms are aggravated by exertion, standing and walking. She has tried rest and NSAIDs (injections, Tramadol) for the symptoms.            I personally reviewed and updated the patient's allergies, medications, problem list, and past medical, surgical, social, and family history. I have reviewed and confirmed the accuracy of the History of Present Illness and Review of Symptoms as documented by the MA/LEIGHN/RN. Ever Champagne MD    Family History   Problem Relation Age of Onset    Heart disease Mother     Hypertension Mother     Melanoma Sister     Thyroid cancer Sister     Diabetes Paternal Grandmother     Diabetes Paternal Grandfather     Cancer Half-Sister         thyroid cancer       Social History     Tobacco Use    Smoking status: Former     Current packs/day: 0.00     Average packs/day: 1.5 packs/day for 37.0 years (55.5 ttl pk-yrs)     Types: Cigarettes, Electronic Cigarette     Start date: 1975     Quit date: 2013     Years since quittin.0    Smokeless tobacco: Never   Vaping Use    Vaping status: Former    Start date: 2011    Quit date: 2013    Substances: Nicotine, Flavoring    Devices: Disposable    Substance Use Topics    Alcohol use: Yes    Drug use: Never       Past Surgical History:   Procedure Laterality Date    BREAST SURGERY  1999    Begin tumor    HYSTERECTOMY      TONSILLECTOMY  1994       Patient Active Problem List   Diagnosis    History of tobacco use    Other fatigue    Overweight with body mass index (BMI) of 29 to 29.9 in adult    Other headache syndrome    Arthritis    Annual visit for general adult medical examination with abnormal findings    Screening for malignant neoplasm of colon    Screening for malignant neoplasm of cervix    Encounter for screening for malignant neoplasm of breast    Shift work sleep disorder    Herpes    Recurrent cold sores    Elbow injury, right, initial encounter    Other hyperlipidemia    Lateral epicondylitis of right elbow    Plantar fasciitis    Right wrist pain    Peripheral polyneuropathy         Current Outpatient Medications:     acyclovir (ZOVIRAX) 400 MG tablet, Take 1 tablet by mouth 5 (Five) Times a Day. Take no more than 5 doses a day., Disp: 25 tablet, Rfl: 11    acyclovir (ZOVIRAX) 5 % ointment, Apply 1 application topically to the appropriate area as directed 2 (Two) Times a Day As Needed (cold sores). Begin treatment at earliest sign or symptom., Disp: 30 g, Rfl: 11    loratadine (CLARITIN) 10 MG tablet, Take 1 tablet by mouth Daily., Disp: 90 tablet, Rfl: 3    modafinil (PROVIGIL) 100 MG tablet, TAKE 1 TABLET BY MOUTH EVERY DAY, Disp: 30 tablet, Rfl: 2    multivitamin (THERAGRAN) tablet tablet, Take  by mouth Daily., Disp: , Rfl:     Semaglutide,0.25 or 0.5MG/DOS, (Ozempic, 0.25 or 0.5 MG/DOSE,) 2 MG/3ML solution pen-injector, Inject 0.5 mg under the skin into the appropriate area as directed 1 (One) Time Per Week. Okay to compound, Disp: 2 mL, Rfl: 1    traMADol (ULTRAM) 50 MG tablet, TAKE 1 TABLET BY MOUTH TWICE A DAY AS NEEDED, SPARINGLY, Disp: 60 tablet, Rfl: 0    valACYclovir (VALTREX) 1000 MG tablet, , Disp: , Rfl:     amitriptyline  "(ELAVIL) 25 MG tablet, Take 0.5-1 tablets by mouth At Night As Needed for Sleep. (Patient not taking: Reported on 1/17/2025), Disp: 90 tablet, Rfl: 3    celecoxib (CeleBREX) 200 MG capsule, Take 1 capsule by mouth Daily. (Patient not taking: Reported on 1/17/2025), Disp: 90 capsule, Rfl: 3    gabapentin (NEURONTIN) 100 MG capsule, Take 1 capsule by mouth 3 (Three) Times a Day As Needed (Plantar Fasciitis)., Disp: 90 capsule, Rfl: 2    montelukast (SINGULAIR) 10 MG tablet, Take 1 tablet by mouth Every Night. (Patient not taking: Reported on 7/22/2024), Disp: 90 tablet, Rfl: 3    neomycin-polymyxin-hydrocortisone (CORTISPORIN) 3.5-09417-2 otic suspension, Administer 3 drops into ear(s) as directed by provider 2 (Two) Times a Day. (Patient not taking: Reported on 7/22/2024), Disp: 10 mL, Rfl: 2         Review of Systems   Constitutional:  Negative for chills and diaphoresis.   HENT:  Negative for trouble swallowing and voice change.    Eyes:  Negative for visual disturbance.   Respiratory:  Negative for shortness of breath.    Cardiovascular:  Negative for chest pain and palpitations.   Gastrointestinal:  Negative for abdominal pain and nausea.   Endocrine: Negative for polydipsia and polyphagia.   Genitourinary:  Positive for dysuria. Negative for hematuria.   Musculoskeletal:  Negative for neck stiffness.   Skin:  Negative for color change and pallor.   Allergic/Immunologic: Negative for immunocompromised state.   Neurological:  Positive for weakness and numbness. Negative for seizures and syncope.   Hematological:  Negative for adenopathy.   Psychiatric/Behavioral:  Negative for hallucinations, sleep disturbance and suicidal ideas.        I have reviewed and confirmed the accuracy of the ROS as documented by the MA/LPN/RN Ever Champagne MD      Objective   /72 (BP Location: Right arm, Patient Position: Lying, Cuff Size: Adult)   Pulse 89   Temp 98.7 °F (37.1 °C) (Temporal)   Resp 18   Ht 168 cm (66.14\")   " Wt 72.8 kg (160 lb 9.6 oz)   SpO2 96%   BMI 25.81 kg/m²   BP Readings from Last 3 Encounters:   01/17/25 120/72   10/18/24 124/74   07/22/24 128/62     Wt Readings from Last 3 Encounters:   01/17/25 72.8 kg (160 lb 9.6 oz)   10/18/24 72.4 kg (159 lb 9.6 oz)   07/22/24 72.8 kg (160 lb 9.6 oz)     Physical Exam  Constitutional:       Appearance: Normal appearance. She is well-developed. She is not diaphoretic.   HENT:      Head: Normocephalic and atraumatic.      Right Ear: Tympanic membrane, ear canal and external ear normal.      Left Ear: Tympanic membrane, ear canal and external ear normal.      Nose: Nose normal.      Mouth/Throat:      Mouth: Mucous membranes are moist.   Eyes:      General: Lids are normal.      Extraocular Movements: Extraocular movements intact.      Conjunctiva/sclera: Conjunctivae normal.      Pupils: Pupils are equal, round, and reactive to light.   Neck:      Thyroid: No thyromegaly.      Vascular: No carotid bruit or JVD.      Trachea: No tracheal deviation.   Cardiovascular:      Rate and Rhythm: Normal rate and regular rhythm.      Heart sounds: Normal heart sounds. No murmur heard.     No friction rub. No gallop.   Pulmonary:      Effort: Pulmonary effort is normal.      Breath sounds: Normal breath sounds. No stridor. No decreased breath sounds, wheezing or rales.   Abdominal:      General: Bowel sounds are normal. There is no distension.      Palpations: Abdomen is soft. There is no mass.      Tenderness: There is no abdominal tenderness. There is no guarding or rebound.      Hernia: No hernia is present.   Lymphadenopathy:      Head:      Right side of head: No submental, submandibular, tonsillar, preauricular, posterior auricular or occipital adenopathy.      Left side of head: No submental, submandibular, tonsillar, preauricular, posterior auricular or occipital adenopathy.      Cervical: No cervical adenopathy.   Skin:     General: Skin is warm and dry.      Coloration: Skin  "is not pale.   Neurological:      Mental Status: She is alert and oriented to person, place, and time.      Cranial Nerves: No cranial nerve deficit.      Sensory: No sensory deficit.      Coordination: Coordination normal.      Gait: Gait normal.      Deep Tendon Reflexes: Reflexes are normal and symmetric.         Data / Lab Results:    No results found for: \"HGBA1C\"  Lab Results   Component Value Date    Glucose 89 03/23/2024    Glucose 112 (H) 10/18/2020    Glucose, UA Negative 03/22/2024     Lab Results   Component Value Date     (H) 03/23/2024     (H) 03/17/2023     (H) 08/06/2022     No results found for: \"CHOL\"  Lab Results   Component Value Date    TRIG 105 03/23/2024    TRIG 97 03/17/2023    TRIG 124 08/06/2022     Lab Results   Component Value Date    HDL 56 03/23/2024    HDL 59 03/17/2023    HDL 51 08/06/2022     No results found for: \"PSA\"  Lab Results   Component Value Date    WBC 6.1 03/23/2024    HGB 14.1 03/23/2024    HCT 42.3 03/23/2024    MCV 87 03/23/2024     03/23/2024     Lab Results   Component Value Date    TSH 2.330 03/23/2024      Lab Results   Component Value Date    GLUCOSE 89 03/23/2024    BUN 15 03/23/2024    CREATININE 0.83 03/23/2024    EGFRIFNONA 72 10/18/2020    EGFRIFAFRI 72 05/15/2020    BCR 18 03/23/2024    K 4.2 03/23/2024    CO2 24 03/23/2024    CALCIUM 9.6 03/23/2024    PROTENTOTREF 6.5 03/23/2024    ALBUMIN 4.2 03/23/2024    LABIL2 1.8 03/23/2024    AST 20 03/23/2024    ALT 14 03/23/2024     Lab Results   Component Value Date    VOLODYMYR Negative 03/23/2024      No results found for: \"CRP\"   No results found for: \"IRON\", \"TIBC\", \"FERRITIN\"   Lab Results   Component Value Date    [id number] 08/06/2022          Assessment & Plan      Medications        Problem List         [facility] maintenance.  Doing well, vaccines current.  Coated baby aspirin daily.  Discussed calcium and vitamin D.  Discussed health maintenance, screening test, lifestyle " modification.  Followed by OB/GYN Dr. Shook mammogram current.  History of tobacco use.  Significant history, has quit in 2013.. CT scan lung cancer screening benign 2020.  EKG benign 2020.  Fatigue.  Replacing vitamin D, recheck levels.  Start regular exercise program.  Weight gain.  Improved today, has lost weight.  Following quitting smoking.  Discussed diet, exercise, lifestyle modification.  History of intolerance to Wellbutrin, not tolerating Topamax.  Follow-up recheck.  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  Add melatonin  Colon screening.  Colonoscopy 2017 with diverticulosis only.  COVID-19 viral infection.  Recurrent episode, improved/resolved currently, has completed course Paxlovid.  Improved/resolved.  October/20.  Doing well currently, resolved.  Call return if recurrent symptoms.  Has been vaccinated/recommend booster.  Low back pain.  OA hips contributing.  Ice, NSAIDs, rehabilitation exercise discussed.  Consider imaging, PT referral if persistent symptoms.  Acute bacterial sinusitis.  Improved today, has completed course antibiotics.  He does still have stitches that  Allergic rhinitis.  OTC Claritin or Zyrtec.  Cold sores.  Start as needed acyclovir.  Lateral epicondylitis.    Improved today status post injection.  Attempting counterforce strap through Worker's Comp..  Call or return if persistent symptoms.  Peripheral neuropathy..  Likely contributing to foot pain.  Did not tolerate nightly amitriptyline, start as needed gabapentin.  Follow-up recheck.  Plantar faaciitis.  improved today status post bilateral injection, followed by podiatry.Bilateral, she is working long shifts on concrete continue heel cups, nighttime bracing.  Ice, rehabilitation exercise discussed  Right hip pain.  Secondary to OA, bursitis contributing.  Has completed course prednisone.  Add Celebrex, check rheum panel.  Ice, rehabilitation exercise discussed.  Check x-rays.  Orthopedics referral  scheduled.  UTI.  Clinically improved today, on Macrobid per urgent care.  Cartrette fever worsening symptoms..        Diagnoses and all orders for this visit:    1. Plantar fasciitis, bilateral (Primary)  -     traMADol (ULTRAM) 50 MG tablet; TAKE 1 TABLET BY MOUTH TWICE A DAY AS NEEDED, SPARINGLY  Dispense: 60 tablet; Refill: 0  -     gabapentin (NEURONTIN) 100 MG capsule; Take 1 capsule by mouth 3 (Three) Times a Day As Needed (Plantar Fasciitis).  Dispense: 90 capsule; Refill: 2    2. Shift work sleep disorder    3. Class 1 obesity due to excess calories without serious comorbidity with body mass index (BMI) of 30.0 to 30.9 in adult    4. Peripheral polyneuropathy      BMI is >= 25 and <30. (Overweight) The following options were offered after discussion;: exercise counseling/recommendations and nutrition counseling/recommendations          Expected course, medications, and adverse effects discussed.  Call or return if worsening or persistent symptoms.  I wore protective equipment throughout this patient encounter including a mask, gloves, and eye protection.  Hand hygiene was performed before donning protective equipment and after removal when leaving the room. The complete contents of the Assessment and Plan and Data/Lab Results as documented above have been reviewed and addressed by myself with the patient today as part of an ongoing evaluation / treatment plan.  If some of the documentation has been copied from a previous note and is unchanged it indicates that this problem / plan has been assessed today but is stable from a previous visit and no changes have been recommended.

## 2025-01-17 ENCOUNTER — OFFICE VISIT (OUTPATIENT)
Dept: FAMILY MEDICINE CLINIC | Facility: CLINIC | Age: 65
End: 2025-01-17
Payer: COMMERCIAL

## 2025-01-17 VITALS
TEMPERATURE: 98.7 F | HEIGHT: 66 IN | BODY MASS INDEX: 25.81 KG/M2 | OXYGEN SATURATION: 96 % | RESPIRATION RATE: 18 BRPM | SYSTOLIC BLOOD PRESSURE: 120 MMHG | HEART RATE: 89 BPM | DIASTOLIC BLOOD PRESSURE: 72 MMHG | WEIGHT: 160.6 LBS

## 2025-01-17 DIAGNOSIS — M72.2 PLANTAR FASCIITIS, BILATERAL: Primary | ICD-10-CM

## 2025-01-17 DIAGNOSIS — G62.9 PERIPHERAL POLYNEUROPATHY: ICD-10-CM

## 2025-01-17 DIAGNOSIS — E66.09 CLASS 1 OBESITY DUE TO EXCESS CALORIES WITHOUT SERIOUS COMORBIDITY WITH BODY MASS INDEX (BMI) OF 30.0 TO 30.9 IN ADULT: ICD-10-CM

## 2025-01-17 DIAGNOSIS — G47.26 SHIFT WORK SLEEP DISORDER: ICD-10-CM

## 2025-01-17 DIAGNOSIS — Z00.01 ANNUAL VISIT FOR GENERAL ADULT MEDICAL EXAMINATION WITH ABNORMAL FINDINGS: Primary | ICD-10-CM

## 2025-01-17 DIAGNOSIS — R53.83 OTHER FATIGUE: ICD-10-CM

## 2025-01-17 DIAGNOSIS — E78.49 OTHER HYPERLIPIDEMIA: ICD-10-CM

## 2025-01-17 DIAGNOSIS — E66.811 CLASS 1 OBESITY DUE TO EXCESS CALORIES WITHOUT SERIOUS COMORBIDITY WITH BODY MASS INDEX (BMI) OF 30.0 TO 30.9 IN ADULT: ICD-10-CM

## 2025-01-17 PROCEDURE — 99213 OFFICE O/P EST LOW 20 MIN: CPT | Performed by: FAMILY MEDICINE

## 2025-01-17 RX ORDER — GABAPENTIN 100 MG/1
100 CAPSULE ORAL 3 TIMES DAILY PRN
Qty: 90 CAPSULE | Refills: 2 | Status: SHIPPED | OUTPATIENT
Start: 2025-01-17

## 2025-01-17 RX ORDER — TRAMADOL HYDROCHLORIDE 50 MG/1
TABLET ORAL
Qty: 60 TABLET | Refills: 0 | Status: SHIPPED | OUTPATIENT
Start: 2025-01-17

## 2025-01-31 DIAGNOSIS — E66.09 CLASS 1 OBESITY DUE TO EXCESS CALORIES WITHOUT SERIOUS COMORBIDITY WITH BODY MASS INDEX (BMI) OF 30.0 TO 30.9 IN ADULT: ICD-10-CM

## 2025-01-31 DIAGNOSIS — E66.811 CLASS 1 OBESITY DUE TO EXCESS CALORIES WITHOUT SERIOUS COMORBIDITY WITH BODY MASS INDEX (BMI) OF 30.0 TO 30.9 IN ADULT: ICD-10-CM

## 2025-01-31 RX ORDER — SEMAGLUTIDE 0.68 MG/ML
0.5 INJECTION, SOLUTION SUBCUTANEOUS WEEKLY
Qty: 2 ML | Refills: 1 | Status: SHIPPED | OUTPATIENT
Start: 2025-01-31

## 2025-02-26 DIAGNOSIS — E66.811 CLASS 1 OBESITY DUE TO EXCESS CALORIES WITHOUT SERIOUS COMORBIDITY WITH BODY MASS INDEX (BMI) OF 30.0 TO 30.9 IN ADULT: ICD-10-CM

## 2025-02-26 DIAGNOSIS — M72.2 PLANTAR FASCIITIS, BILATERAL: ICD-10-CM

## 2025-02-26 DIAGNOSIS — E66.09 CLASS 1 OBESITY DUE TO EXCESS CALORIES WITHOUT SERIOUS COMORBIDITY WITH BODY MASS INDEX (BMI) OF 30.0 TO 30.9 IN ADULT: ICD-10-CM

## 2025-02-26 DIAGNOSIS — J30.89 SEASONAL ALLERGIC RHINITIS DUE TO OTHER ALLERGIC TRIGGER: ICD-10-CM

## 2025-02-26 RX ORDER — SEMAGLUTIDE 0.68 MG/ML
0.5 INJECTION, SOLUTION SUBCUTANEOUS WEEKLY
Qty: 2 ML | Refills: 1 | Status: SHIPPED | OUTPATIENT
Start: 2025-02-26

## 2025-02-26 RX ORDER — TRAMADOL HYDROCHLORIDE 50 MG/1
TABLET ORAL
Qty: 60 TABLET | Refills: 0 | Status: SHIPPED | OUTPATIENT
Start: 2025-02-26

## 2025-02-26 RX ORDER — LORATADINE 10 MG/1
10 TABLET ORAL DAILY
Qty: 90 TABLET | Refills: 3 | Status: SHIPPED | OUTPATIENT
Start: 2025-02-26

## 2025-03-21 DIAGNOSIS — E66.09 CLASS 1 OBESITY DUE TO EXCESS CALORIES WITHOUT SERIOUS COMORBIDITY WITH BODY MASS INDEX (BMI) OF 30.0 TO 30.9 IN ADULT: ICD-10-CM

## 2025-03-21 DIAGNOSIS — E66.811 CLASS 1 OBESITY DUE TO EXCESS CALORIES WITHOUT SERIOUS COMORBIDITY WITH BODY MASS INDEX (BMI) OF 30.0 TO 30.9 IN ADULT: ICD-10-CM

## 2025-03-23 LAB
ALBUMIN SERPL-MCNC: 4.5 G/DL (ref 3.9–4.9)
ALP SERPL-CCNC: 134 IU/L (ref 44–121)
ALT SERPL-CCNC: 13 IU/L (ref 0–32)
AST SERPL-CCNC: 17 IU/L (ref 0–40)
BASOPHILS # BLD AUTO: 0.1 X10E3/UL (ref 0–0.2)
BASOPHILS NFR BLD AUTO: 1 %
BILIRUB SERPL-MCNC: 0.4 MG/DL (ref 0–1.2)
BUN SERPL-MCNC: 17 MG/DL (ref 8–27)
BUN/CREAT SERPL: 20 (ref 12–28)
CALCIUM SERPL-MCNC: 9.6 MG/DL (ref 8.7–10.3)
CHLORIDE SERPL-SCNC: 103 MMOL/L (ref 96–106)
CHOLEST SERPL-MCNC: 212 MG/DL (ref 100–199)
CHOLEST/HDLC SERPL: 3.2 RATIO (ref 0–4.4)
CO2 SERPL-SCNC: 24 MMOL/L (ref 20–29)
CREAT SERPL-MCNC: 0.84 MG/DL (ref 0.57–1)
EGFRCR SERPLBLD CKD-EPI 2021: 79 ML/MIN/1.73
EOSINOPHIL # BLD AUTO: 0.1 X10E3/UL (ref 0–0.4)
EOSINOPHIL NFR BLD AUTO: 2 %
ERYTHROCYTE [DISTWIDTH] IN BLOOD BY AUTOMATED COUNT: 12.5 % (ref 11.7–15.4)
GLOBULIN SER CALC-MCNC: 2.5 G/DL (ref 1.5–4.5)
GLUCOSE SERPL-MCNC: 87 MG/DL (ref 70–99)
HCT VFR BLD AUTO: 44.8 % (ref 34–46.6)
HDLC SERPL-MCNC: 67 MG/DL
HGB BLD-MCNC: 15 G/DL (ref 11.1–15.9)
IMM GRANULOCYTES # BLD AUTO: 0 X10E3/UL (ref 0–0.1)
IMM GRANULOCYTES NFR BLD AUTO: 0 %
LDLC SERPL CALC-MCNC: 130 MG/DL (ref 0–99)
LYMPHOCYTES # BLD AUTO: 2.2 X10E3/UL (ref 0.7–3.1)
LYMPHOCYTES NFR BLD AUTO: 31 %
MCH RBC QN AUTO: 29.4 PG (ref 26.6–33)
MCHC RBC AUTO-ENTMCNC: 33.5 G/DL (ref 31.5–35.7)
MCV RBC AUTO: 88 FL (ref 79–97)
MONOCYTES # BLD AUTO: 0.5 X10E3/UL (ref 0.1–0.9)
MONOCYTES NFR BLD AUTO: 7 %
NEUTROPHILS # BLD AUTO: 4.3 X10E3/UL (ref 1.4–7)
NEUTROPHILS NFR BLD AUTO: 59 %
PLATELET # BLD AUTO: 248 X10E3/UL (ref 150–450)
POTASSIUM SERPL-SCNC: 4.8 MMOL/L (ref 3.5–5.2)
PROT SERPL-MCNC: 7 G/DL (ref 6–8.5)
RBC # BLD AUTO: 5.1 X10E6/UL (ref 3.77–5.28)
SODIUM SERPL-SCNC: 142 MMOL/L (ref 134–144)
TRIGL SERPL-MCNC: 83 MG/DL (ref 0–149)
TSH SERPL DL<=0.005 MIU/L-ACNC: 2.71 UIU/ML (ref 0.45–4.5)
VLDLC SERPL CALC-MCNC: 15 MG/DL (ref 5–40)
WBC # BLD AUTO: 7.2 X10E3/UL (ref 3.4–10.8)

## 2025-03-24 RX ORDER — SEMAGLUTIDE 0.68 MG/ML
0.5 INJECTION, SOLUTION SUBCUTANEOUS WEEKLY
Qty: 2 ML | Refills: 1 | Status: SHIPPED | OUTPATIENT
Start: 2025-03-24

## 2025-04-16 NOTE — PROGRESS NOTES
Subjective   Sraiah Arnold is a 64 y.o. female.     Chief Complaint   Patient presents with    Annual Exam    Plantar Fasciitis    Hyperlipidemia    Insomnia       The patient is here: to discuss health maintenance and disease prevention to follow up on multiple medical problems.  Last comprehensive physical was on 3/22/2024.  Previous physical was performed by  Ever Champagne MD  Overall has: moderate activity with work/home activities, good appetite, feels well with minor complaints, decreased energy level, and is sleeping poorly. Nutrition: appropriate balanced diet and eating a variety of foods. Last tetanus shot was  5/1/2019 . Patient is doing routine self breast exams: occasionally    Foot Pain  This is a chronic problem. The current episode started more than 1 year ago. The problem occurs constantly. The problem has been unchanged. Associated symptoms include numbness and weakness. Pertinent negatives include no abdominal pain, chest pain, chills, diaphoresis or nausea. Associated symptoms comments: Sariah stands on her feet constantly at work, and notices her feet have really been bothering her in the last 8 months. Constant pain. Currently, a 4/10 pain score but after work she states its a 10/10.     10/9- She states her feet are doing well. She has been doing injections and states they are more of a numb feeling now. Doing better than before. The symptoms are aggravated by exertion, standing and walking. She has tried rest and NSAIDs (injections, Tramadol) for the symptoms.   Hyperlipidemia  This is a chronic problem. The current episode started more than 1 month ago. Exacerbating diseases include obesity. There are no known factors aggravating her hyperlipidemia. Associated symptoms include leg pain. Pertinent negatives include no chest pain or shortness of breath. She is currently on no antihyperlipidemic treatment. There are no compliance problems.  Risk factors for coronary artery disease include  obesity.   Insomnia  Associated symptoms include numbness and weakness. Pertinent negatives include no abdominal pain, chest pain, chills, diaphoresis or nausea.        Recent Hospitalizations:  No hospitalization(s) within the last year..  ccc      I personally reviewed and updated the patient's allergies, medications, problem list, and past medical, surgical, social, and family history. I have reviewed and confirmed the accuracy of the HPI and ROS as documented by the MA/LPN/RN Ever Champagne MD    Family History   Problem Relation Age of Onset    Heart disease Mother     Hypertension Mother     Melanoma Sister     Thyroid cancer Sister     Diabetes Paternal Grandmother     Diabetes Paternal Grandfather     Cancer Half-Sister         thyroid cancer       Social History     Tobacco Use    Smoking status: Former     Current packs/day: 0.00     Average packs/day: 1.5 packs/day for 37.0 years (55.5 ttl pk-yrs)     Types: Cigarettes, Electronic Cigarette     Start date: 1975     Quit date: 2013     Years since quittin.3    Smokeless tobacco: Never   Vaping Use    Vaping status: Former    Start date: 2011    Quit date: 2013    Substances: Flavoring   Substance Use Topics    Alcohol use: Yes    Drug use: Never       Past Surgical History:   Procedure Laterality Date    BREAST SURGERY      Begin tumor    HYSTERECTOMY      TONSILLECTOMY         Patient Active Problem List   Diagnosis    History of tobacco use    Other fatigue    Overweight with body mass index (BMI) of 29 to 29.9 in adult    Other headache syndrome    Arthritis    Annual visit for general adult medical examination with abnormal findings    Screening for malignant neoplasm of colon    Screening for malignant neoplasm of cervix    Encounter for screening for malignant neoplasm of breast    Shift work sleep disorder    Herpes    Recurrent cold sores    Elbow injury, right, initial encounter    Other hyperlipidemia    Lateral  epicondylitis of right elbow    Plantar fasciitis    Right wrist pain    Peripheral polyneuropathy         Current Outpatient Medications:     acyclovir (ZOVIRAX) 400 MG tablet, Take 1 tablet by mouth 5 (Five) Times a Day. Take no more than 5 doses a day., Disp: 25 tablet, Rfl: 11    acyclovir (ZOVIRAX) 5 % ointment, Apply 1 application topically to the appropriate area as directed 2 (Two) Times a Day As Needed (cold sores). Begin treatment at earliest sign or symptom., Disp: 30 g, Rfl: 11    gabapentin (NEURONTIN) 100 MG capsule, Take 1 capsule by mouth 3 (Three) Times a Day As Needed (Plantar Fasciitis)., Disp: 90 capsule, Rfl: 2    loratadine (CLARITIN) 10 MG tablet, Take 1 tablet by mouth Daily., Disp: 90 tablet, Rfl: 3    multivitamin (THERAGRAN) tablet tablet, Take  by mouth Daily., Disp: , Rfl:     Semaglutide,0.25 or 0.5MG/DOS, (Ozempic, 0.25 or 0.5 MG/DOSE,) 2 MG/3ML solution pen-injector, Inject 0.5 mg under the skin into the appropriate area as directed 1 (One) Time Per Week. Okay to compound, Disp: 2 mL, Rfl: 1    traMADol (ULTRAM) 50 MG tablet, TAKE 1 TABLET BY MOUTH TWICE A DAY AS NEEDED, SPARINGLY, Disp: 60 tablet, Rfl: 0    valACYclovir (VALTREX) 1000 MG tablet, , Disp: , Rfl:     amitriptyline (ELAVIL) 25 MG tablet, Take 0.5-1 tablets by mouth At Night As Needed for Sleep. (Patient not taking: Reported on 4/18/2025), Disp: 90 tablet, Rfl: 3    celecoxib (CeleBREX) 200 MG capsule, Take 1 capsule by mouth Daily. (Patient not taking: Reported on 10/18/2024), Disp: 90 capsule, Rfl: 3    modafinil (PROVIGIL) 100 MG tablet, TAKE 1 TABLET BY MOUTH EVERY DAY (Patient not taking: Reported on 4/18/2025), Disp: 30 tablet, Rfl: 2    montelukast (SINGULAIR) 10 MG tablet, Take 1 tablet by mouth Every Night. (Patient not taking: Reported on 4/18/2025), Disp: 90 tablet, Rfl: 3    neomycin-polymyxin-hydrocortisone (CORTISPORIN) 3.5-00604-0 otic suspension, Administer 3 drops into ear(s) as directed by provider 2  "(Two) Times a Day. (Patient not taking: Reported on 4/18/2025), Disp: 10 mL, Rfl: 2    Review of Systems   Constitutional:  Negative for chills and diaphoresis.   HENT:  Negative for trouble swallowing and voice change.    Eyes:  Negative for visual disturbance.   Respiratory:  Negative for shortness of breath.    Cardiovascular:  Negative for chest pain and palpitations.   Gastrointestinal:  Negative for abdominal pain and nausea.   Endocrine: Negative for polydipsia and polyphagia.   Genitourinary:  Negative for hematuria.   Musculoskeletal:  Negative for neck stiffness.   Skin:  Negative for color change and pallor.   Allergic/Immunologic: Negative for immunocompromised state.   Neurological:  Positive for weakness and numbness. Negative for seizures and syncope.   Hematological:  Negative for adenopathy.   Psychiatric/Behavioral:  Negative for sleep disturbance and suicidal ideas. The patient has insomnia.        I have reviewed and confirmed the accuracy of the ROS as documented by the MA/LPN/RN Ever Champagne MD      Objective   /80 (BP Location: Right arm, Patient Position: Sitting, Cuff Size: Adult)   Pulse 120   Temp 97.5 °F (36.4 °C) (Temporal)   Resp 20   Ht 168 cm (66.14\")   Wt 71.2 kg (157 lb)   SpO2 96%   BMI 25.23 kg/m²   BP Readings from Last 3 Encounters:   04/18/25 122/80   01/17/25 120/72   10/18/24 124/74     Wt Readings from Last 3 Encounters:   04/18/25 71.2 kg (157 lb)   01/17/25 72.8 kg (160 lb 9.6 oz)   10/18/24 72.4 kg (159 lb 9.6 oz)     Physical Exam  Constitutional:       Appearance: Normal appearance. She is well-developed. She is not diaphoretic.   HENT:      Head: Normocephalic and atraumatic.      Right Ear: Tympanic membrane, ear canal and external ear normal.      Left Ear: Tympanic membrane, ear canal and external ear normal.      Nose: Nose normal.      Mouth/Throat:      Mouth: Mucous membranes are moist.   Eyes:      General: Lids are normal.      Extraocular " Movements: Extraocular movements intact.      Conjunctiva/sclera: Conjunctivae normal.      Pupils: Pupils are equal, round, and reactive to light.   Neck:      Thyroid: No thyromegaly.      Vascular: No carotid bruit or JVD.      Trachea: No tracheal deviation.   Cardiovascular:      Rate and Rhythm: Normal rate and regular rhythm.      Heart sounds: Normal heart sounds. No murmur heard.     No friction rub. No gallop.   Pulmonary:      Effort: Pulmonary effort is normal.      Breath sounds: Normal breath sounds. No stridor. No decreased breath sounds, wheezing or rales.   Abdominal:      General: Bowel sounds are normal. There is no distension.      Palpations: Abdomen is soft. There is no mass.      Tenderness: There is no abdominal tenderness. There is no guarding or rebound.      Hernia: No hernia is present.   Musculoskeletal:      Right ankle: Normal. No swelling, deformity or ecchymosis. No tenderness. No lateral malleolus, medial malleolus, AITF ligament, CF ligament, posterior TF ligament or proximal fibula tenderness. Normal range of motion. Normal pulse.      Right Achilles Tendon: Normal. Vela's test negative.      Left ankle: Normal. No swelling, deformity or ecchymosis. No tenderness. No lateral malleolus, medial malleolus, AITF ligament, CF ligament, posterior TF ligament or proximal fibula tenderness. Normal range of motion. Normal pulse.      Left Achilles Tendon: Normal. Vela's test negative.      Right foot: Normal. Normal range of motion. No swelling, deformity, tenderness or crepitus.      Left foot: Normal. Normal range of motion. No swelling, deformity, tenderness or crepitus.   Lymphadenopathy:      Head:      Right side of head: No submental, submandibular, tonsillar, preauricular, posterior auricular or occipital adenopathy.      Left side of head: No submental, submandibular, tonsillar, preauricular, posterior auricular or occipital adenopathy.      Cervical: No cervical  "adenopathy.   Skin:     General: Skin is warm and dry.      Coloration: Skin is not pale.   Neurological:      Mental Status: She is alert and oriented to person, place, and time.      Cranial Nerves: No cranial nerve deficit.      Sensory: No sensory deficit.      Coordination: Coordination normal.      Gait: Gait normal.      Deep Tendon Reflexes: Reflexes are normal and symmetric.         Data / Lab Results:    No results found for: \"HGBA1C\"  Lab Results   Component Value Date    Glucose 87 03/22/2025    Glucose 112 (H) 10/18/2020    Glucose, UA Negative 04/18/2025     Lab Results   Component Value Date     (H) 03/22/2025     (H) 03/23/2024     (H) 03/17/2023     No results found for: \"CHOL\"  Lab Results   Component Value Date    TRIG 83 03/22/2025    TRIG 105 03/23/2024    TRIG 97 03/17/2023     Lab Results   Component Value Date    HDL 67 03/22/2025    HDL 56 03/23/2024    HDL 59 03/17/2023     No results found for: \"PSA\"  Lab Results   Component Value Date    WBC 7.2 03/22/2025    HGB 15.0 03/22/2025    HCT 44.8 03/22/2025    MCV 88 03/22/2025     03/22/2025     Lab Results   Component Value Date    TSH 2.710 03/22/2025     Lab Results   Component Value Date    GLUCOSE 87 03/22/2025    BUN 17 03/22/2025    CREATININE 0.84 03/22/2025    EGFRIFNONA 72 10/18/2020    EGFRIFAFRI 72 05/15/2020    BCR 20 03/22/2025    K 4.8 03/22/2025    CO2 24 03/22/2025    CALCIUM 9.6 03/22/2025    ALBUMIN 4.5 03/22/2025    AST 17 03/22/2025    ALT 13 03/22/2025     Lab Results   Component Value Date    VOLODYMYR Negative 03/23/2024      No results found for: \"CRP\"   No results found for: \"IRON\", \"TIBC\", \"FERRITIN\"   Lab Results   Component Value Date    [id number] 08/06/2022        Age-appropriate Screening Schedule:  Refer to the list below for future screening recommendations based on patient's age, sex and/or medical conditions. Orders for these recommended tests are listed in the plan section. The " patient has been provided with a written plan.    Health Maintenance   Topic Date Due    Pneumococcal Vaccine 50+ (1 of 1 - PCV) Never done    ZOSTER VACCINE (1 of 2) Never done    LUNG CANCER SCREENING  09/04/2021    COVID-19 Vaccine (2 - 2024-25 season) 09/01/2024    HEPATITIS C SCREENING  04/18/2026 (Originally 1/9/2020)    INFLUENZA VACCINE  07/01/2025    LIPID PANEL  03/22/2026    ANNUAL PHYSICAL  04/18/2026    MAMMOGRAM  06/14/2026    COLORECTAL CANCER SCREENING  04/02/2028    TDAP/TD VACCINES (4 - Td or Tdap) 05/01/2029           Assessment & Plan      Medications        Problem List         LOS    Physical.  Doing well, vaccines current.  Coated baby aspirin daily.  Discussed calcium and vitamin D.  Discussed health maintenance, screening test, lifestyle modification.  Followed by OB/GYN Dr. Shook in the past.  Mammogram current plan repeat this summer.  History of tobacco use.  Significant history, has quit in 2013.. CT scan lung cancer screening benign 2020.  EKG benign 2020.  Hyperlipidemia.  Mild elevation discussed diet, exercise and lifestyle modification, add red yeast rice.  Follow-up recheck.  Fatigue.  Replacing vitamin D, recheck levels.  Start regular exercise program.  Weight gain.  Improved today, has lost weight.  Following quitting smoking.  Discussed diet, exercise, lifestyle modification.  History of intolerance to Wellbutrin, not tolerating Topamax.  Follow-up recheck.  Shift work disorder.  Resolved currently, has retired, off Provigil.  Colon screening.  Colonoscopy 2017 with diverticulosis only.  COVID-19 viral infection.  Recurrent episode, improved/resolved currently, has completed course Paxlovid.  Improved/resolved.  October/20.  Doing well currently, resolved.  Call return if recurrent symptoms.  Has been vaccinated/recommend booster.  Low back pain.  OA hips contributing.  Ice, NSAIDs, rehabilitation exercise discussed.  Consider imaging, PT referral if persistent symptoms.  Acute  bacterial sinusitis.  Improved today, has completed course antibiotics.  He does still have stitches that  Allergic rhinitis.  OTC Claritin or Zyrtec.  Cold sores.  Start as needed acyclovir.  Lateral epicondylitis.    Improved today status post injection.  Attempting counterforce strap through Worker's Comp..  Call or return if persistent symptoms.  Peripheral neuropathy..  Likely contributing to foot pain.  Did not tolerate nightly amitriptyline, start as needed gabapentin.  Follow-up recheck.  Plantar faaciitis.  improved today status post bilateral injection, followed by podiatry.Bilateral, has had improvement since retired continue heel cups, nighttime bracing.  Ice, rehabilitation exercise discussed  Right hip pain.  Secondary to OA, bursitis contributing.  Has completed course prednisone.  Add Celebrex, check rheum panel.  Ice, rehabilitation exercise discussed.  Check x-rays.  Orthopedics referral scheduled.  Acute bacterial sinusitis.  Start antibiotics..    Diagnoses and all orders for this visit:    1. Annual visit for general adult medical examination with abnormal findings (Primary)  -     POCT urinalysis dipstick, manual    2. Plantar fasciitis    3. Other hyperlipidemia    4. Shift work sleep disorder    5. Class 1 obesity due to excess calories without serious comorbidity with body mass index (BMI) of 30.0 to 30.9 in adult    6. Screening for malignant neoplasm of cervix    7. Screening for malignant neoplasm of colon              Expected course, medications, and adverse effects discussed.  Call or return if worsening or persistent symptoms.  I wore protective equipment throughout this patient encounter including a mask, gloves, and eye protection.  Hand hygiene was performed before donning protective equipment and after removal when leaving the room. The complete contents of the Assessment and Plan and Data / Lab Results as documented above have been reviewed and addressed by myself with the  patient today as part of an ongoing evaluation / treatment plan.  If some of the documentation has been copied from a previous note and is unchanged it indicates that this problem / plan has been assessed today but is stable from a previous visit and no changes have been recommended.  The separate E/M service provided today is significant, medically necessary, and separately identifiable.

## 2025-04-18 ENCOUNTER — OFFICE VISIT (OUTPATIENT)
Dept: FAMILY MEDICINE CLINIC | Facility: CLINIC | Age: 65
End: 2025-04-18
Payer: COMMERCIAL

## 2025-04-18 VITALS
WEIGHT: 157 LBS | DIASTOLIC BLOOD PRESSURE: 80 MMHG | SYSTOLIC BLOOD PRESSURE: 122 MMHG | HEIGHT: 66 IN | OXYGEN SATURATION: 96 % | HEART RATE: 120 BPM | BODY MASS INDEX: 25.23 KG/M2 | RESPIRATION RATE: 20 BRPM | TEMPERATURE: 97.5 F

## 2025-04-18 DIAGNOSIS — E78.49 OTHER HYPERLIPIDEMIA: ICD-10-CM

## 2025-04-18 DIAGNOSIS — Z00.01 ANNUAL VISIT FOR GENERAL ADULT MEDICAL EXAMINATION WITH ABNORMAL FINDINGS: Primary | ICD-10-CM

## 2025-04-18 DIAGNOSIS — G47.26 SHIFT WORK SLEEP DISORDER: ICD-10-CM

## 2025-04-18 DIAGNOSIS — Z12.31 SCREENING MAMMOGRAM FOR BREAST CANCER: ICD-10-CM

## 2025-04-18 DIAGNOSIS — Z12.4 SCREENING FOR MALIGNANT NEOPLASM OF CERVIX: ICD-10-CM

## 2025-04-18 DIAGNOSIS — H92.09 EARACHE: ICD-10-CM

## 2025-04-18 DIAGNOSIS — M72.2 PLANTAR FASCIITIS: ICD-10-CM

## 2025-04-18 DIAGNOSIS — E66.811 CLASS 1 OBESITY DUE TO EXCESS CALORIES WITHOUT SERIOUS COMORBIDITY WITH BODY MASS INDEX (BMI) OF 30.0 TO 30.9 IN ADULT: ICD-10-CM

## 2025-04-18 DIAGNOSIS — E66.09 CLASS 1 OBESITY DUE TO EXCESS CALORIES WITHOUT SERIOUS COMORBIDITY WITH BODY MASS INDEX (BMI) OF 30.0 TO 30.9 IN ADULT: ICD-10-CM

## 2025-04-18 DIAGNOSIS — M72.2 PLANTAR FASCIITIS, BILATERAL: ICD-10-CM

## 2025-04-18 DIAGNOSIS — Z12.11 SCREENING FOR MALIGNANT NEOPLASM OF COLON: ICD-10-CM

## 2025-04-18 LAB
BILIRUB BLD-MCNC: NEGATIVE MG/DL
CLARITY, POC: CLEAR
COLOR UR: YELLOW
GLUCOSE UR STRIP-MCNC: NEGATIVE MG/DL
KETONES UR QL: NEGATIVE
LEUKOCYTE EST, POC: NEGATIVE
NITRITE UR-MCNC: NEGATIVE MG/ML
PH UR: 5 [PH] (ref 5–8)
PROT UR STRIP-MCNC: ABNORMAL MG/DL
RBC # UR STRIP: NEGATIVE /UL
SP GR UR: 1.02 (ref 1–1.03)
UROBILINOGEN UR QL: NORMAL

## 2025-04-18 RX ORDER — ASPIRIN 81 MG/1
81 TABLET ORAL DAILY
COMMUNITY

## 2025-04-18 RX ORDER — AZITHROMYCIN 250 MG/1
TABLET, FILM COATED ORAL
Qty: 6 TABLET | Refills: 0 | Status: SHIPPED | OUTPATIENT
Start: 2025-04-18

## 2025-04-18 RX ORDER — GABAPENTIN 100 MG/1
100 CAPSULE ORAL 3 TIMES DAILY PRN
Qty: 90 CAPSULE | Refills: 2 | Status: CANCELLED | OUTPATIENT
Start: 2025-04-18

## 2025-04-18 RX ORDER — SEMAGLUTIDE 0.68 MG/ML
0.5 INJECTION, SOLUTION SUBCUTANEOUS WEEKLY
Qty: 2 ML | Refills: 1 | Status: SHIPPED | OUTPATIENT
Start: 2025-04-18

## 2025-04-18 RX ORDER — TRAMADOL HYDROCHLORIDE 50 MG/1
TABLET ORAL
Qty: 60 TABLET | Refills: 2 | Status: SHIPPED | OUTPATIENT
Start: 2025-04-18

## 2025-04-18 RX ORDER — UBIDECARENONE 50 MG
2400 CAPSULE ORAL DAILY
COMMUNITY

## 2025-05-23 DIAGNOSIS — E66.811 CLASS 1 OBESITY DUE TO EXCESS CALORIES WITHOUT SERIOUS COMORBIDITY WITH BODY MASS INDEX (BMI) OF 30.0 TO 30.9 IN ADULT: ICD-10-CM

## 2025-05-23 DIAGNOSIS — E66.09 CLASS 1 OBESITY DUE TO EXCESS CALORIES WITHOUT SERIOUS COMORBIDITY WITH BODY MASS INDEX (BMI) OF 30.0 TO 30.9 IN ADULT: ICD-10-CM

## 2025-05-23 DIAGNOSIS — M72.2 PLANTAR FASCIITIS, BILATERAL: ICD-10-CM

## 2025-05-23 RX ORDER — SEMAGLUTIDE 0.68 MG/ML
0.5 INJECTION, SOLUTION SUBCUTANEOUS WEEKLY
Qty: 2 ML | Refills: 5 | Status: SHIPPED | OUTPATIENT
Start: 2025-05-23

## 2025-05-23 RX ORDER — TRAMADOL HYDROCHLORIDE 50 MG/1
TABLET ORAL
Qty: 60 TABLET | Refills: 2 | Status: SHIPPED | OUTPATIENT
Start: 2025-05-23

## 2025-05-23 RX ORDER — ASPIRIN 81 MG/1
81 TABLET ORAL DAILY
Qty: 90 TABLET | Refills: 3 | Status: SHIPPED | OUTPATIENT
Start: 2025-05-23

## 2025-06-17 ENCOUNTER — RESULTS FOLLOW-UP (OUTPATIENT)
Dept: FAMILY MEDICINE CLINIC | Facility: CLINIC | Age: 65
End: 2025-06-17
Payer: COMMERCIAL

## 2025-06-19 DIAGNOSIS — E66.09 CLASS 1 OBESITY DUE TO EXCESS CALORIES WITHOUT SERIOUS COMORBIDITY WITH BODY MASS INDEX (BMI) OF 30.0 TO 30.9 IN ADULT: ICD-10-CM

## 2025-06-19 DIAGNOSIS — E66.811 CLASS 1 OBESITY DUE TO EXCESS CALORIES WITHOUT SERIOUS COMORBIDITY WITH BODY MASS INDEX (BMI) OF 30.0 TO 30.9 IN ADULT: ICD-10-CM

## 2025-06-20 RX ORDER — SEMAGLUTIDE 0.68 MG/ML
0.5 INJECTION, SOLUTION SUBCUTANEOUS WEEKLY
Qty: 2 ML | Refills: 5 | Status: SHIPPED | OUTPATIENT
Start: 2025-06-20

## 2025-06-25 NOTE — PROGRESS NOTES
Subjective   Sariah Arnold is a 64 y.o. female.     Chief Complaint   Patient presents with    Foot Pain    neck swelling       History of Present Illness  Foot Pain:   Patient complains of bilateral pain which is described as numbing and tingling.    Patient reports that the pain has been present for a few years.   Patient has numbness/tingling.   Patient denies history of gout.  Symptoms are aggravated by walking for long periods.   Symptoms are relieved by Tramadol.       Patient reports she has a spot on her neck and says it swells. She stats sometimes it aches and she's concerned about it.            I personally reviewed and updated the patient's allergies, medications, problem list, and past medical, surgical, social, and family history. I have reviewed and confirmed the accuracy of the History of Present Illness and Review of Symptoms as documented by the MA/LEIGHN/RN. Ever Champagne MD    Family History   Problem Relation Age of Onset    Heart disease Mother     Hypertension Mother     Melanoma Sister     Thyroid cancer Sister     Diabetes Paternal Grandmother     Diabetes Paternal Grandfather     Cancer Half-Sister         thyroid cancer       Social History     Tobacco Use    Smoking status: Former     Current packs/day: 0.00     Average packs/day: 1.5 packs/day for 37.0 years (55.5 ttl pk-yrs)     Types: Cigarettes, Electronic Cigarette     Start date: 1975     Quit date: 2013     Years since quittin.5    Smokeless tobacco: Never   Vaping Use    Vaping status: Former    Start date: 2011    Quit date: 2013    Substances: Flavoring   Substance Use Topics    Alcohol use: Yes    Drug use: Never       Past Surgical History:   Procedure Laterality Date    BREAST SURGERY      Begin tumor    HYSTERECTOMY      TONSILLECTOMY         Patient Active Problem List   Diagnosis    History of tobacco use    Other fatigue    Overweight with body mass index (BMI) of 29 to 29.9 in adult    Other  headache syndrome    Arthritis    Annual visit for general adult medical examination with abnormal findings    Screening for malignant neoplasm of colon    Screening for malignant neoplasm of cervix    Encounter for screening for malignant neoplasm of breast    Shift work sleep disorder    Herpes    Recurrent cold sores    Elbow injury, right, initial encounter    Other hyperlipidemia    Lateral epicondylitis of right elbow    Plantar fasciitis    Right wrist pain    Peripheral polyneuropathy         Current Outpatient Medications:     Alpha Lipoic Acid 200 MG capsule, Take 200 mg by mouth Daily., Disp: , Rfl:     aspirin 81 MG EC tablet, Take 1 tablet by mouth Daily., Disp: 90 tablet, Rfl: 3    gabapentin (NEURONTIN) 100 MG capsule, Take 1 capsule by mouth 3 (Three) Times a Day As Needed (Plantar Fasciitis)., Disp: 90 capsule, Rfl: 2    montelukast (SINGULAIR) 10 MG tablet, Take 1 tablet by mouth Every Night., Disp: 90 tablet, Rfl: 3    multivitamin (THERAGRAN) tablet tablet, Take  by mouth Daily., Disp: , Rfl:     Semaglutide,0.25 or 0.5MG/DOS, (Ozempic, 0.25 or 0.5 MG/DOSE,) 2 MG/3ML solution pen-injector, Inject 0.5 mg under the skin into the appropriate area as directed 1 (One) Time Per Week. Okay to Compound Semaglutide with B12 for Energy/Nutritional Support, Disp: 2 mL, Rfl: 5    traMADol (ULTRAM) 50 MG tablet, TAKE 1 TABLET BY MOUTH TWICE A DAY AS NEEDED, SPARINGLY, Disp: 60 tablet, Rfl: 2    acyclovir (ZOVIRAX) 400 MG tablet, Take 1 tablet by mouth 5 (Five) Times a Day. Take no more than 5 doses a day. (Patient not taking: Reported on 6/27/2025), Disp: 25 tablet, Rfl: 11    acyclovir (ZOVIRAX) 5 % ointment, Apply 1 application topically to the appropriate area as directed 2 (Two) Times a Day As Needed (cold sores). Begin treatment at earliest sign or symptom. (Patient not taking: Reported on 6/27/2025), Disp: 30 g, Rfl: 11    loratadine (CLARITIN) 10 MG tablet, Take 1 tablet by mouth Daily. (Patient not  "taking: Reported on 6/27/2025), Disp: 90 tablet, Rfl: 3    Red Yeast Rice 600 MG tablet, Take 2,400 mg by mouth Daily. (Patient not taking: Reported on 6/27/2025), Disp: , Rfl:     valACYclovir (VALTREX) 1000 MG tablet, , Disp: , Rfl:          Review of Systems   Constitutional:  Negative for chills and diaphoresis.   HENT:  Negative for trouble swallowing and voice change.    Eyes:  Negative for visual disturbance.   Respiratory:  Negative for shortness of breath.    Cardiovascular:  Negative for chest pain and palpitations.   Gastrointestinal:  Negative for abdominal pain and nausea.   Endocrine: Negative for polydipsia and polyphagia.   Genitourinary:  Negative for hematuria.   Musculoskeletal:  Negative for neck stiffness.   Skin:  Negative for color change and pallor.   Allergic/Immunologic: Negative for immunocompromised state.   Neurological:  Negative for seizures and syncope.   Hematological:  Negative for adenopathy.   Psychiatric/Behavioral:  Negative for hallucinations, sleep disturbance and suicidal ideas.        I have reviewed and confirmed the accuracy of the ROS as documented by the MA/LPN/RN Ever Champagne MD      Objective   /86 (BP Location: Right arm, Patient Position: Sitting, Cuff Size: Adult)   Pulse 110   Temp 98.4 °F (36.9 °C) (Temporal)   Resp 18   Ht 170.2 cm (67\")   Wt 74.6 kg (164 lb 6.4 oz)   SpO2 94%   BMI 25.75 kg/m²   BP Readings from Last 3 Encounters:   06/27/25 120/86   04/18/25 122/80   01/17/25 120/72     Wt Readings from Last 3 Encounters:   06/27/25 74.6 kg (164 lb 6.4 oz)   04/18/25 71.2 kg (157 lb)   01/17/25 72.8 kg (160 lb 9.6 oz)     Physical Exam  Constitutional:       Appearance: Normal appearance. She is well-developed. She is not diaphoretic.   HENT:      Head: Normocephalic and atraumatic.      Right Ear: Hearing, tympanic membrane, ear canal and external ear normal.      Left Ear: Hearing, tympanic membrane, ear canal and external ear normal.      " Nose: Nose normal. No mucosal edema or congestion.      Right Sinus: No maxillary sinus tenderness or frontal sinus tenderness.      Left Sinus: No maxillary sinus tenderness or frontal sinus tenderness.      Mouth/Throat:      Mouth: Mucous membranes are moist. No oral lesions.      Pharynx: Uvula midline. No oropharyngeal exudate or posterior oropharyngeal erythema.      Tonsils: No tonsillar exudate.   Eyes:      General: Lids are normal.      Extraocular Movements: Extraocular movements intact.      Conjunctiva/sclera: Conjunctivae normal.      Pupils: Pupils are equal, round, and reactive to light.   Neck:      Thyroid: No thyromegaly.      Vascular: No carotid bruit or JVD.      Trachea: No tracheal deviation.   Cardiovascular:      Rate and Rhythm: Normal rate and regular rhythm.      Heart sounds: Normal heart sounds. No murmur heard.     No friction rub. No gallop.   Pulmonary:      Effort: Pulmonary effort is normal.      Breath sounds: Normal breath sounds. No stridor. No decreased breath sounds, wheezing or rales.   Abdominal:      General: Bowel sounds are normal. There is no distension.      Palpations: Abdomen is soft. There is no mass.      Tenderness: There is no abdominal tenderness. There is no guarding or rebound.      Hernia: No hernia is present.   Lymphadenopathy:      Head:      Right side of head: No submental, submandibular, tonsillar, preauricular, posterior auricular or occipital adenopathy.      Left side of head: No submental, submandibular, tonsillar, preauricular, posterior auricular or occipital adenopathy.      Cervical: No cervical adenopathy.   Skin:     General: Skin is warm and dry.      Coloration: Skin is not pale.   Neurological:      Mental Status: She is alert and oriented to person, place, and time.      Cranial Nerves: No cranial nerve deficit.      Sensory: No sensory deficit.      Coordination: Coordination normal.      Gait: Gait normal.      Deep Tendon Reflexes:  "Reflexes are normal and symmetric.         Data / Lab Results:    No results found for: \"HGBA1C\"  Lab Results   Component Value Date    Glucose 87 03/22/2025    Glucose 112 (H) 10/18/2020    Glucose, UA Negative 04/18/2025     Lab Results   Component Value Date     (H) 03/22/2025     (H) 03/23/2024     (H) 03/17/2023     No results found for: \"CHOL\"  Lab Results   Component Value Date    TRIG 83 03/22/2025    TRIG 105 03/23/2024    TRIG 97 03/17/2023     Lab Results   Component Value Date    HDL 67 03/22/2025    HDL 56 03/23/2024    HDL 59 03/17/2023     No results found for: \"PSA\"  Lab Results   Component Value Date    WBC 7.2 03/22/2025    HGB 15.0 03/22/2025    HCT 44.8 03/22/2025    MCV 88 03/22/2025     03/22/2025     Lab Results   Component Value Date    TSH 2.710 03/22/2025      Lab Results   Component Value Date    GLUCOSE 87 03/22/2025    BUN 17 03/22/2025    CREATININE 0.84 03/22/2025    EGFRIFNONA 72 10/18/2020    EGFRIFAFRI 72 05/15/2020    BCR 20 03/22/2025    K 4.8 03/22/2025    CO2 24 03/22/2025    CALCIUM 9.6 03/22/2025    ALBUMIN 4.5 03/22/2025    AST 17 03/22/2025    ALT 13 03/22/2025     Lab Results   Component Value Date    VOLODYMYR Negative 03/23/2024      No results found for: \"CRP\"   No results found for: \"IRON\", \"TIBC\", \"FERRITIN\"   Lab Results   Component Value Date    [id number] 08/06/2022          Assessment & Plan      Medications        Problem List         [facility] maintenancs.  Doing well, vaccines current.  Coated baby aspirin daily.  Discussed calcium and vitamin D.  Discussed health maintenance, screening test, lifestyle modification.  Followed by OB/GYN Dr. Shook in the past.  Mammogram current plan repeat this summer.  Enlarged thyroid.  Per exam today.  Check ultrasound.  History of tobacco use.  Significant history, has quit in 2013.. CT scan lung cancer screening benign 2020.  EKG benign 2020.  Hyperlipidemia.  Mild elevation discussed diet, " exercise and lifestyle modification, add red yeast rice.  Follow-up recheck.  Fatigue.  Replacing vitamin D, recheck levels.  Start regular exercise program.  Weight gain.  Improved today, has lost weight.  Following quitting smoking.  Discussed diet, exercise, lifestyle modification.  History of intolerance to Wellbutrin, not tolerating Topamax.  Follow-up recheck.  Shift work disorder.  Resolved currently, has retired, off Provigil.  Colon screening.  Colonoscopy 2017 with diverticulosis only.  COVID-19 viral infection.  Recurrent episode, improved/resolved currently, has completed course Paxlovid.  Improved/resolved.  October/20.  Doing well currently, resolved.  Call return if recurrent symptoms.  Has been vaccinated/recommend booster.  Low back pain.  OA hips contributing.  Ice, NSAIDs, rehabilitation exercise discussed.  Consider imaging, PT referral if persistent symptoms.  Acute bacterial sinusitis.  Improved today, has completed course antibiotics.  He does still have stitches that  Allergic rhinitis.  OTC Claritin or Zyrtec.  Cold sores.  Start as needed acyclovir.  Lateral epicondylitis.    Improved today status post injection.  Attempting counterforce strap through Worker's [employer]..  Call or return if persistent symptoms.  Peripheral neuropathy..  Likely contributing to foot pain.  Did not tolerate nightly amitriptyline, improved on as needed gabapentin.  Follow-up recheck.  Consider lumbar injury.  Plantar faaciitis.  improved today status post bilateral injection, followed by podiatry.Bilateral, has had improvement since retired continue heel cups, nighttime bracing.  Ice, rehabilitation exercise discussed  Right hip pain.  Secondary to OA, bursitis contributing.  Has completed course prednisone.  Add Celebrex, check rheum panel.  Ice, rehabilitation exercise discussed.  Check x-rays.  Orthopedics referral scheduled.  Acute bacterial sinusitis.  Start antibiotics..        Diagnoses and all orders for  this visit:    1. Enlarged thyroid (Primary)  -     US Thyroid; Future    2. Arthritis    3. Plantar fasciitis    4. Peripheral polyneuropathy              Expected course, medications, and adverse effects discussed.  Call or return if worsening or persistent symptoms.  I wore protective equipment throughout this patient encounter including a mask, gloves, and eye protection.  Hand hygiene was performed before donning protective equipment and after removal when leaving the room. The complete contents of the Assessment and Plan and Data/Lab Results as documented above have been reviewed and addressed by myself with the patient today as part of an ongoing evaluation / treatment plan.  If some of the documentation has been copied from a previous note and is unchanged it indicates that this problem / plan has been assessed today but is stable from a previous visit and no changes have been recommended.

## 2025-06-27 ENCOUNTER — OFFICE VISIT (OUTPATIENT)
Dept: FAMILY MEDICINE CLINIC | Facility: CLINIC | Age: 65
End: 2025-06-27
Payer: COMMERCIAL

## 2025-06-27 VITALS
TEMPERATURE: 98.4 F | HEART RATE: 110 BPM | DIASTOLIC BLOOD PRESSURE: 86 MMHG | BODY MASS INDEX: 25.8 KG/M2 | HEIGHT: 67 IN | WEIGHT: 164.4 LBS | OXYGEN SATURATION: 94 % | SYSTOLIC BLOOD PRESSURE: 120 MMHG | RESPIRATION RATE: 18 BRPM

## 2025-06-27 DIAGNOSIS — G62.9 PERIPHERAL POLYNEUROPATHY: ICD-10-CM

## 2025-06-27 DIAGNOSIS — M19.90 ARTHRITIS: ICD-10-CM

## 2025-06-27 DIAGNOSIS — E04.9 ENLARGED THYROID: Primary | ICD-10-CM

## 2025-06-27 DIAGNOSIS — M72.2 PLANTAR FASCIITIS: ICD-10-CM

## 2025-06-27 RX ORDER — MV-MIN NO.113/IRON/FOLIC ACID 4.5 MG-2
200 CAPSULE ORAL DAILY
COMMUNITY

## 2025-07-03 ENCOUNTER — HOSPITAL ENCOUNTER (OUTPATIENT)
Dept: ULTRASOUND IMAGING | Facility: HOSPITAL | Age: 65
Discharge: HOME | End: 2025-07-03
Admitting: FAMILY MEDICINE
Payer: COMMERCIAL

## 2025-07-03 DIAGNOSIS — E04.9 ENLARGED THYROID: ICD-10-CM

## 2025-07-03 PROCEDURE — 76536 US EXAM OF HEAD AND NECK: CPT

## 2025-07-05 ENCOUNTER — RESULTS FOLLOW-UP (OUTPATIENT)
Dept: FAMILY MEDICINE CLINIC | Facility: CLINIC | Age: 65
End: 2025-07-05
Payer: COMMERCIAL

## 2025-07-16 DIAGNOSIS — E66.811 CLASS 1 OBESITY DUE TO EXCESS CALORIES WITHOUT SERIOUS COMORBIDITY WITH BODY MASS INDEX (BMI) OF 30.0 TO 30.9 IN ADULT: ICD-10-CM

## 2025-07-16 DIAGNOSIS — E66.09 CLASS 1 OBESITY DUE TO EXCESS CALORIES WITHOUT SERIOUS COMORBIDITY WITH BODY MASS INDEX (BMI) OF 30.0 TO 30.9 IN ADULT: ICD-10-CM

## 2025-07-16 DIAGNOSIS — M72.2 PLANTAR FASCIITIS, BILATERAL: ICD-10-CM

## 2025-07-18 RX ORDER — SEMAGLUTIDE 0.68 MG/ML
0.5 INJECTION, SOLUTION SUBCUTANEOUS WEEKLY
Qty: 2 ML | Refills: 5 | Status: SHIPPED | OUTPATIENT
Start: 2025-07-18

## 2025-07-18 RX ORDER — GABAPENTIN 100 MG/1
100 CAPSULE ORAL 3 TIMES DAILY PRN
Qty: 90 CAPSULE | Refills: 2 | Status: SHIPPED | OUTPATIENT
Start: 2025-07-18

## 2025-07-18 RX ORDER — TRAMADOL HYDROCHLORIDE 50 MG/1
TABLET ORAL
Qty: 60 TABLET | Refills: 2 | Status: SHIPPED | OUTPATIENT
Start: 2025-07-18

## 2025-07-18 RX ORDER — ASPIRIN 81 MG/1
81 TABLET ORAL DAILY
Qty: 90 TABLET | Refills: 3 | Status: SHIPPED | OUTPATIENT
Start: 2025-07-18

## 2025-08-12 DIAGNOSIS — E66.09 CLASS 1 OBESITY DUE TO EXCESS CALORIES WITHOUT SERIOUS COMORBIDITY WITH BODY MASS INDEX (BMI) OF 30.0 TO 30.9 IN ADULT: ICD-10-CM

## 2025-08-12 DIAGNOSIS — E66.811 CLASS 1 OBESITY DUE TO EXCESS CALORIES WITHOUT SERIOUS COMORBIDITY WITH BODY MASS INDEX (BMI) OF 30.0 TO 30.9 IN ADULT: ICD-10-CM

## 2025-08-12 RX ORDER — SEMAGLUTIDE 0.68 MG/ML
0.5 INJECTION, SOLUTION SUBCUTANEOUS WEEKLY
Qty: 2 ML | Refills: 5 | Status: SHIPPED | OUTPATIENT
Start: 2025-08-12